# Patient Record
Sex: MALE | Race: BLACK OR AFRICAN AMERICAN | NOT HISPANIC OR LATINO | Employment: OTHER | URBAN - METROPOLITAN AREA
[De-identification: names, ages, dates, MRNs, and addresses within clinical notes are randomized per-mention and may not be internally consistent; named-entity substitution may affect disease eponyms.]

---

## 2017-04-05 ENCOUNTER — DEVICE CHECK (OUTPATIENT)
Dept: CARDIOLOGY | Facility: MEDICAL CENTER | Age: 82
End: 2017-04-05

## 2017-04-29 ENCOUNTER — OFFICE VISIT (OUTPATIENT)
Dept: URGENT CARE | Facility: PHYSICIAN GROUP | Age: 82
End: 2017-04-29
Payer: MEDICARE

## 2017-04-29 VITALS
OXYGEN SATURATION: 95 % | BODY MASS INDEX: 21.82 KG/M2 | HEIGHT: 68 IN | DIASTOLIC BLOOD PRESSURE: 70 MMHG | HEART RATE: 73 BPM | RESPIRATION RATE: 12 BRPM | TEMPERATURE: 98.2 F | SYSTOLIC BLOOD PRESSURE: 120 MMHG | WEIGHT: 144 LBS

## 2017-04-29 DIAGNOSIS — J40 BRONCHITIS: ICD-10-CM

## 2017-04-29 PROCEDURE — 3288F FALL RISK ASSESSMENT DOCD: CPT | Performed by: PHYSICIAN ASSISTANT

## 2017-04-29 PROCEDURE — 0518F FALL PLAN OF CARE DOCD: CPT | Mod: 8P | Performed by: PHYSICIAN ASSISTANT

## 2017-04-29 PROCEDURE — 1036F TOBACCO NON-USER: CPT | Performed by: PHYSICIAN ASSISTANT

## 2017-04-29 PROCEDURE — G8432 DEP SCR NOT DOC, RNG: HCPCS | Performed by: PHYSICIAN ASSISTANT

## 2017-04-29 PROCEDURE — 4040F PNEUMOC VAC/ADMIN/RCVD: CPT | Mod: 8P | Performed by: PHYSICIAN ASSISTANT

## 2017-04-29 PROCEDURE — 99203 OFFICE O/P NEW LOW 30 MIN: CPT | Performed by: PHYSICIAN ASSISTANT

## 2017-04-29 PROCEDURE — G8420 CALC BMI NORM PARAMETERS: HCPCS | Performed by: PHYSICIAN ASSISTANT

## 2017-04-29 PROCEDURE — 1100F PTFALLS ASSESS-DOCD GE2>/YR: CPT | Performed by: PHYSICIAN ASSISTANT

## 2017-04-29 RX ORDER — CODEINE PHOSPHATE AND GUAIFENESIN 10; 100 MG/5ML; MG/5ML
5 SOLUTION ORAL NIGHTLY PRN
Qty: 120 ML | Refills: 0 | Status: SHIPPED | OUTPATIENT
Start: 2017-04-29 | End: 2017-06-13

## 2017-04-29 NOTE — PROGRESS NOTES
Chief Complaint   Patient presents with   • Cough     cough, chest congestion x1week       HISTORY OF PRESENT ILLNESS: Patient is a 89 y.o. male who presents today with a friend for evaluation of persistent cough. Patient states he's been coughing for the past 6-8 months. He reports white sputum production. He denies much coughing during the day but does have a fair amount at night, making it difficult for him to sleep. He denies fever, shortness of breath, chills, sweats, body aches, ear pain, nasal congestion, and sore throat. The symptoms are not getting worse. He saw primary care provider in [place] and was told that it seems to be some sort of allergy.    Patient Active Problem List    Diagnosis Date Noted   • Cardiac pacemaker in situ      Priority: High   • Bradycardia 09/23/2015     Priority: High   • Hyperlipidemia 10/22/2015     Priority: Medium   • Renovascular hypertension 09/23/2015     Priority: Medium   • Anemia 11/12/2015     Priority: Low   • Dizziness 05/27/2016   • BPH (benign prostatic hypertrophy) 05/27/2016       Allergies:Review of patient's allergies indicates no known allergies.    Current Outpatient Prescriptions Ordered in [facility]   Medication Sig Dispense Refill   • guaifenesin-codeine (ROBITUSSIN AC) Solution oral solution Take 5 mL by mouth at bedtime as needed. 120 mL 0   • meclizine (ANTIVERT) 25 MG Tab Take 1 Tab by mouth 3 times a day as needed for Dizziness or Vertigo. 90 Tab 3   • tamsulosin (FLOMAX) 0.4 MG capsule Take 1 Cap by mouth every day. At night. 90 Cap 3   • finasteride (PROSCAR) 5 MG Tab Take 5 mg by mouth every day.       No current [facility]-ordered facility-administered medications on file.       Past Medical History   Diagnosis Date   • Hyperlipidemia    • Arrhythmia      Bradycardia    • Renovascular hypertension    • Sinus bradycardia September 2015     Status post PPM implantation.   • BPH (benign prostatic hypertrophy)    • Dizziness        Social History   Substance  "Use Topics   • Smoking status: Former Smoker -- 0.25 packs/day for 10 years     Types: Cigarettes     Quit date: 1957   • Smokeless tobacco: Never Used   • Alcohol Use: No       Family Status   Relation Status Death Age   • Mother     • Father       Family History   Problem Relation Age of Onset   • Heart Attack Neg Hx    • Heart Failure Neg Hx    • Heart Disease Mother    • Heart Disease Father        ROS:   Review of Systems   Constitutional: Negative for fever, chills, weight loss and malaise/fatigue.   HENT: Negative for ear pain, nosebleeds, congestion, sore throat and neck pain.    Eyes: Negative for blurred vision.   Respiratory: Negative for shortness of breath and wheezing.    Cardiovascular: Negative for chest pain, palpitations, orthopnea and leg swelling.   Gastrointestinal: Negative for heartburn, nausea, vomiting and abdominal pain.   Genitourinary: Negative for dysuria, urgency and frequency.       Exam:  Blood pressure 120/70, pulse 73, temperature 36.8 °C (98.2 °F), resp. rate 12, height 1.727 m (5' 8\"), weight 65.318 kg (144 lb), SpO2 95 %.  General: Thin appearing pleasant gentleman in no apparent distress.  HEENT: Conjunctiva clear, lids without ptosis, ears normal shape and contour, canals are clear bilaterally, tympanic membranes are benign, nasal mucosa benign, oropharynx is without erythema, edema or exudates.  Pulmonary: Clear to ausculation and percussion.  Normal effort. No rales, ronchi, or wheezing.   Cardiovascular: Regular rate and rhythm without murmur.   Neurologic: Grossly nonfocal.  Lymph: No cervical lymphadenopathy noted.  Skin: No obvious lesions.  Psych: Normal mood. Alert and oriented x3. Judgment and insight is normal.    No xray available on site    Assessment/Plan:  Discussed with patient that this could be due to allergies. Chest x-ray to rule out pneumonia. Use all medication as directed. Will contact patient with x-ray results.  1. Bronchitis  " guaifenesin-codeine (ROBITUSSIN AC) Solution oral solution    DX-CHEST-2 VIEWS

## 2017-04-29 NOTE — MR AVS SNAPSHOT
"        Ryan CORREA Felipe   2017 12:55 PM   Office Visit   MRN: [id number]    Department:  [facility]   Dept Phone:  [phone]    Description:  Male : 9/10/1921   Provider:  Radha Law PA-C           Reason for Visit     Cough cough, chest congestion x1week      Allergies as of 2017     No Known Allergies      You were diagnosed with     Bronchitis   [[id number]]         Vital Signs     Blood Pressure Pulse Temperature Respirations Height Weight    120/70 mmHg 73 36.8 °C (98.2 °F) 12 1.727 m (5' 8\") 65.318 kg (144 lb)    Body Mass Index Oxygen Saturation Smoking Status             21.90 kg/m2 95% Former Smoker         Basic Information     Date Of Birth Sex Race Ethnicity Preferred Language    9/10/1921 Male Black or  Non- English      Your appointments     May 24, 2017  8:20 AM   FOLLOW UP with Selena López M.D.   [facility]    [address] [place] [id number]   [phone]              Problem List              ICD-10-CM Priority Class Noted - Resolved    Bradycardia R00.1 High  2015 - Present    Renovascular hypertension I15.0 Medium  2015 - Present    Cardiac pacemaker in situ (Chronic) Z95.0 High  Unknown - Present    Hyperlipidemia E78.5 Medium  10/22/2015 - Present    Anemia D64.9 Low  2015 - Present    Dizziness R42   2016 - Present    BPH (benign prostatic hypertrophy) N40.0   2016 - Present      Health Maintenance        Date Due Completion Dates    IMM DTaP/Tdap/Td Vaccine (1 - Tdap) 9/10/1940 ---    IMM ZOSTER VACCINE 9/10/1981 ---    IMM PNEUMOCOCCAL 65+ (ADULT) LOW/MEDIUM RISK SERIES (1 of 2 - PCV13) 9/10/1986 ---            Current Immunizations     No immunizations on file.      Below and/or attached are the medications your provider expects you to take. Review all of your home medications and newly ordered medications with your provider and/or pharmacist. Follow " medication instructions as directed by your provider and/or pharmacist. Please keep your medication list with you and share with your provider. Update the information when medications are discontinued, doses are changed, or new medications (including over-the-counter products) are added; and carry medication information at all times in the event of emergency situations     Allergies:  No Known Allergies          Medications  Valid as of: April 29, 2017 -  1:31 PM    Generic Name Brand Name Tablet Size Instructions for use    Finasteride (Tab) PROSCAR 5 MG Take 5 mg by mouth every day.        Guaifenesin-Codeine (Solution) ROBITUSSIN -10 mg/5mL Take 5 mL by mouth at bedtime as needed.        Meclizine HCl (Tab) ANTIVERT 25 MG Take 1 Tab by mouth 3 times a day as needed for Dizziness or Vertigo.        Tamsulosin HCl (Cap) FLOMAX 0.4 MG Take 1 Cap by mouth every day. At night.        .                 Medicines prescribed today were sent to:     [facility] [address] [facility] [place] [address] [facility]     [address] [facility] [address]    Phone: [phone] Fax: [phone]    Open 24 Hours?: No      Medication refill instructions:       If your prescription bottle indicates you have medication refills left, it is not necessary to call your provider’s office. Please contact your pharmacy and they will refill your medication.    If your prescription bottle indicates you do not have any refills left, you may request refills at any time through one of the following ways: The online [employer] system (except Urgent Care), by calling your provider’s office, or by asking your pharmacy to contact your provider’s office with a refill request. Medication refills are processed only during regular business hours and may not be available until the next business day. Your provider may request additional information or to have a follow-up visit with you prior to refilling your medication.   *Please  Note: Medication refills are assigned a new Rx number when refilled electronically. Your pharmacy may indicate that no refills were authorized even though a new prescription for the same medication is available at the pharmacy. Please request the medicine by name with the pharmacy before contacting your provider for a refill.        Your To Do List     Future Labs/Procedures Complete By Expires    DX-CHEST-2 VIEWS  As directed 4/29/2018         [employer] Access Code: Activation code not generated  Current [employer] Status: Active

## 2017-05-01 ENCOUNTER — TELEPHONE (OUTPATIENT)
Dept: URGENT CARE | Facility: PHYSICIAN GROUP | Age: 82
End: 2017-05-01

## 2017-05-01 DIAGNOSIS — J18.9 PNEUMONIA OF RIGHT LOWER LOBE DUE TO INFECTIOUS ORGANISM: ICD-10-CM

## 2017-05-01 RX ORDER — DOXYCYCLINE HYCLATE 100 MG
100 TABLET ORAL 2 TIMES DAILY
Qty: 20 TAB | Refills: 0 | Status: SHIPPED | OUTPATIENT
Start: 2017-05-01 | End: 2017-06-13

## 2017-05-01 NOTE — TELEPHONE ENCOUNTER
Possible developing right lower lobe pneumonia per chest x-ray from [place], [place]. Discussed results with Joanne Blankenship, friend of patient who accompanied him to the visit. Since the doxycycline to [facility]. Advised follow up for worsening or persistent symptoms.

## 2017-05-24 ENCOUNTER — OFFICE VISIT (OUTPATIENT)
Dept: CARDIOLOGY | Facility: CLINIC | Age: 82
End: 2017-05-24
Payer: MEDICARE

## 2017-05-24 VITALS
DIASTOLIC BLOOD PRESSURE: 70 MMHG | SYSTOLIC BLOOD PRESSURE: 110 MMHG | BODY MASS INDEX: 22.13 KG/M2 | WEIGHT: 146 LBS | HEART RATE: 72 BPM | HEIGHT: 68 IN | OXYGEN SATURATION: 92 %

## 2017-05-24 DIAGNOSIS — I15.0 RENOVASCULAR HYPERTENSION: ICD-10-CM

## 2017-05-24 DIAGNOSIS — R00.1 BRADYCARDIA: ICD-10-CM

## 2017-05-24 DIAGNOSIS — Z95.0 CARDIAC PACEMAKER IN SITU: Chronic | ICD-10-CM

## 2017-05-24 PROCEDURE — 99214 OFFICE O/P EST MOD 30 MIN: CPT | Performed by: INTERNAL MEDICINE

## 2017-05-24 PROCEDURE — G8420 CALC BMI NORM PARAMETERS: HCPCS | Performed by: INTERNAL MEDICINE

## 2017-05-24 PROCEDURE — 3288F FALL RISK ASSESSMENT DOCD: CPT | Performed by: INTERNAL MEDICINE

## 2017-05-24 PROCEDURE — 4040F PNEUMOC VAC/ADMIN/RCVD: CPT | Mod: 8P | Performed by: INTERNAL MEDICINE

## 2017-05-24 PROCEDURE — 0518F FALL PLAN OF CARE DOCD: CPT | Mod: 8P | Performed by: INTERNAL MEDICINE

## 2017-05-24 PROCEDURE — 1100F PTFALLS ASSESS-DOCD GE2>/YR: CPT | Performed by: INTERNAL MEDICINE

## 2017-05-24 PROCEDURE — 1036F TOBACCO NON-USER: CPT | Performed by: INTERNAL MEDICINE

## 2017-05-24 PROCEDURE — G8432 DEP SCR NOT DOC, RNG: HCPCS | Performed by: INTERNAL MEDICINE

## 2017-05-24 ASSESSMENT — ENCOUNTER SYMPTOMS
HEARTBURN: 0
NAUSEA: 0
HEADACHES: 0
WEIGHT LOSS: 0
DEPRESSION: 0
BRUISES/BLEEDS EASILY: 0
PALPITATIONS: 0
COUGH: 0
DIZZINESS: 0
SHORTNESS OF BREATH: 0
NERVOUS/ANXIOUS: 0
LOSS OF CONSCIOUSNESS: 0
EYES NEGATIVE: 1

## 2017-05-24 NOTE — PROGRESS NOTES
Subjective:   Ryan Wang is a 89 y.o. male who presents today in follow-up in regards to his pacemaker implanted for dizziness and bradycardia    He is in good spirits. He is now 89  Seeing him in [place], seen last month in [place] pr PM check and Sept for Me & PM check  Seen for pneumonia last month    Past Medical History   Diagnosis Date   • Hyperlipidemia    • Arrhythmia      Bradycardia    • Renovascular hypertension    • Sinus bradycardia September 2015     Status post PPM implantation.   • BPH (benign prostatic hypertrophy)    • Dizziness      Past Surgical History   Procedure Laterality Date   • Tonsillectomy     • Recovery  9/29/2015     Procedure: CATH LAB PM INSERT [facility].JU[facility] ASENCIO;  Surgeon: [facility];  Location: SURGERY PRE-POST PROC UNIT [facility];  Service:    • Pacemaker insertion  September 2015     [employer] DR #[id number] implanted by Dr. Asencio.     Family History   Problem Relation Age of Onset   • Heart Attack Neg Hx    • Heart Failure Neg Hx    • Heart Disease Mother    • Heart Disease Father      History   Smoking status   • Former Smoker -- 0.25 packs/day for 10 years   • Types: Cigarettes   • Quit date: 01/01/1957   Smokeless tobacco   • Never Used     No Known Allergies  Outpatient Encounter Prescriptions as of 5/24/2017   Medication Sig Dispense Refill   • finasteride (PROSCAR) 5 MG Tab Take 5 mg by mouth every day.     • tamsulosin (FLOMAX) 0.4 MG capsule Take 1 Cap by mouth every day. At night. 90 Cap 3   • doxycycline (VIBRAMYCIN) 100 MG Tab Take 1 Tab by mouth 2 times a day. 20 Tab 0   • guaifenesin-codeine (ROBITUSSIN AC) Solution oral solution Take 5 mL by mouth at bedtime as needed. 120 mL 0   • meclizine (ANTIVERT) 25 MG Tab Take 1 Tab by mouth 3 times a day as needed for Dizziness or Vertigo. 90 Tab 3     No facility-administered encounter medications on file as of 5/24/2017.     Review of Systems   Constitutional: Negative for weight loss and malaise/fatigue.   HENT:  "Negative for hearing loss.    Eyes: Negative.    Respiratory: Negative for cough and shortness of breath.    Cardiovascular: Negative for chest pain, palpitations and leg swelling.   Gastrointestinal: Negative for heartburn and nausea.   Neurological: Negative for dizziness, loss of consciousness and headaches.   Endo/Heme/Allergies: Does not bruise/bleed easily.   Psychiatric/Behavioral: Negative for depression. The patient is not nervous/anxious.    All other systems reviewed and are negative.       Objective:   /70 mmHg  Pulse 72  Ht 1.727 m (5' 7.99\")  Wt 66.225 kg (146 lb)  BMI 22.20 kg/m2  SpO2 92%    Physical Exam   Constitutional: He is oriented to person, place, and time. He appears well-developed. No distress.   Healthy slender  gentleman, much younger than stated age, very well kempt   HENT:   Mouth/Throat: Mucous membranes are normal.   Eyes: Conjunctivae and EOM are normal.   Neck: No JVD present. No thyroid mass present.   Cardiovascular: Normal rate, regular rhythm and intact distal pulses.    No murmur heard.  PM in left chest   Pulmonary/Chest: Effort normal and breath sounds normal.   Musculoskeletal: Normal range of motion. He exhibits no edema.   Neurological: He is alert and oriented to person, place, and time. He has normal strength. He displays no tremor.   Skin: Skin is warm and dry. He is not diaphoretic.   Psychiatric: He has a normal mood and affect. His behavior is normal.   Vitals reviewed.      Assessment:     1. Bradycardia     2. Renovascular hypertension     3. Cardiac pacemaker in situ         Medical Decision Making:  Today's Assessment / Status / Plan:     I gave him congratulations. We talked about health and is longevity in his life in [place]. We talked about end-of-life care. He certainly does not want life-sustaining measures that, he feels very thankful for his health    PM/AF  No more AF, device functioning normally no changes made.  We " reviewed his monitor nora.   I certainly am not interested at this point in starting him on anticoagulation. His echo 2015 was normal    PNA, normal exam, records reviewed   RTC 6 months pacemaker check ONLY - Ill see him in 1y in [place]

## 2017-05-24 NOTE — Clinical Note
[facility] [address]  Phone: [phone]  Fax: [phone]              Ryan Wang  9/10/1921    Encounter Date: 5/24/2017    Selena López M.D.          PROGRESS NOTE:  Subjective:   Ryan Wang is a 89 y.o. male who presents today in follow-up in regards to his pacemaker implanted for dizziness and bradycardia    He is in good spirits. He is now 89  Seeing him in [place], seen last month in [facility] PM check and Sept for Me & PM check  Seen for pneumonia last month    Past Medical History   Diagnosis Date   • Hyperlipidemia    • Arrhythmia      Bradycardia    • Renovascular hypertension    • Sinus bradycardia September 2015     Status post PPM implantation.   • BPH (benign prostatic hypertrophy)    • Dizziness      Past Surgical History   Procedure Laterality Date   • Tonsillectomy     • Recovery  9/29/2015     Procedure: CATH LAB PM INSERT [facility] ASENC[facility];  Surgeon: Recovery[facility] Surgery;  [place] [facility];  Service:    • Pacemaker insertion  September 2015     [employer] [facility] #[id number] implanted by Dr. Asencio.     Family History   Problem Relation Age of Onset   • Heart Attack Neg Hx    • Heart Failure Neg Hx    • Heart Disease Mother    • Heart Disease Father      History   Smoking status   • Former Smoker -- 0.25 packs/day for 10 years   • Types: Cigarettes   • Quit date: 01/01/1957   Smokeless tobacco   • Never Used     No Known Allergies  Outpatient Encounter Prescriptions as of 5/24/2017   Medication Sig Dispense Refill   • finasteride (PROSCAR) 5 MG Tab Take 5 mg by mouth every day.     • tamsulosin (FLOMAX) 0.4 MG capsule Take 1 Cap by mouth every day. At night. 90 Cap 3   • doxycycline (VIBRAMYCIN) 100 MG Tab Take 1 Tab by mouth 2 times a day. 20 Tab 0   • guaifenesin-codeine (ROBITUSSIN AC) Solution oral solution Take 5 mL by mouth at bedtime as needed. 120 mL 0   • meclizine  "(ANTIVERT) 25 MG Tab Take 1 Tab by mouth 3 times a day as needed for Dizziness or Vertigo. 90 Tab 3     No facility-administered encounter medications on file as of 5/24/2017.     Review of Systems   Constitutional: Negative for weight loss and malaise/fatigue.   HENT: Negative for hearing loss.    Eyes: Negative.    Respiratory: Negative for cough and shortness of breath.    Cardiovascular: Negative for chest pain, palpitations and leg swelling.   Gastrointestinal: Negative for heartburn and nausea.   Neurological: Negative for dizziness, loss of consciousness and headaches.   Endo/Heme/Allergies: Does not bruise/bleed easily.   Psychiatric/Behavioral: Negative for depression. The patient is not nervous/anxious.    All other systems reviewed and are negative.       Objective:   /70 mmHg  Pulse 72  Ht 1.727 m (5' 7.99\")  Wt 66.225 kg (146 lb)  BMI 22.20 kg/m2  SpO2 92%    Physical Exam   Constitutional: He is oriented to person, place, and time. He appears well-developed. No distress.   Healthy slender  gentleman, much younger than stated age, very well kempt   HENT:   Mouth/Throat: Mucous membranes are normal.   Eyes: Conjunctivae and EOM are normal.   Neck: No JVD present. No thyroid mass present.   Cardiovascular: Normal rate, regular rhythm and intact distal pulses.    No murmur heard.  PM in left chest   Pulmonary/Chest: Effort normal and breath sounds normal.   Musculoskeletal: Normal range of motion. He exhibits no edema.   Neurological: He is alert and oriented to person, place, and time. He has normal strength. He displays no tremor.   Skin: Skin is warm and dry. He is not diaphoretic.   Psychiatric: He has a normal mood and affect. His behavior is normal.   Vitals reviewed.      Assessment:     1. Bradycardia     2. Renovascular hypertension     3. Cardiac pacemaker in situ         Medical Decision Making:  Today's Assessment / Status / Plan:     I gave him congratulations. We " talked about health and is longevity in his life in [place]. We talked about end-of-life care. He certainly does not want life-sustaining measures that, he feels very thankful for his health    PM/AF  No more AF, device functioning normally no changes made.  We reviewed his monitor tracinng.   I certainly am not interested at this point in starting him on anticoagulation. His echo 2015 was normal    PNA, normal exam, records reviewed   RTC 6 months pacemaker check ONLY - Ill see him in 1y in [place]          Vincent Hart M.D.  [address] [place] [id number]  VIA Facsimile: [phone]

## 2017-06-13 ENCOUNTER — OFFICE VISIT (OUTPATIENT)
Dept: URGENT CARE | Facility: PHYSICIAN GROUP | Age: 82
End: 2017-06-13
Payer: MEDICARE

## 2017-06-13 VITALS
RESPIRATION RATE: 12 BRPM | SYSTOLIC BLOOD PRESSURE: 102 MMHG | WEIGHT: 145 LBS | TEMPERATURE: 98.1 F | DIASTOLIC BLOOD PRESSURE: 60 MMHG | HEART RATE: 87 BPM | BODY MASS INDEX: 21.98 KG/M2 | OXYGEN SATURATION: 94 % | HEIGHT: 68 IN

## 2017-06-13 DIAGNOSIS — J44.9 CHRONIC OBSTRUCTIVE PULMONARY DISEASE, UNSPECIFIED COPD TYPE (HCC): Primary | ICD-10-CM

## 2017-06-13 DIAGNOSIS — R06.2 WHEEZE: ICD-10-CM

## 2017-06-13 PROCEDURE — 99214 OFFICE O/P EST MOD 30 MIN: CPT | Performed by: PHYSICIAN ASSISTANT

## 2017-06-13 RX ORDER — ALBUTEROL SULFATE 2.5 MG/3ML
2.5 SOLUTION RESPIRATORY (INHALATION) ONCE
Status: COMPLETED | OUTPATIENT
Start: 2017-06-13 | End: 2017-06-13

## 2017-06-13 RX ORDER — TIOTROPIUM BROMIDE 18 UG/1
18 CAPSULE ORAL; RESPIRATORY (INHALATION) DAILY
Qty: 30 CAP | Refills: 0 | Status: SHIPPED | OUTPATIENT
Start: 2017-06-13 | End: 2017-06-13

## 2017-06-13 RX ORDER — ALBUTEROL SULFATE 90 UG/1
2 AEROSOL, METERED RESPIRATORY (INHALATION) 2 TIMES DAILY PRN
Qty: 1 INHALER | Refills: 0 | Status: SHIPPED | OUTPATIENT
Start: 2017-06-13 | End: 2018-06-27

## 2017-06-13 RX ADMIN — ALBUTEROL SULFATE 2.5 MG: 2.5 SOLUTION RESPIRATORY (INHALATION) at 12:00

## 2017-06-13 NOTE — PROGRESS NOTES
Chief Complaint   Patient presents with   • Cough     cough, chest congestion x2 months        HISTORY OF PRESENT ILLNESS: Patient is a 89 y.o. male who presents today because he reports a history of coughing for the last 9 months. He is not a smoker, however, he did in his early 20s, but this gentleman is a very pleasant 89-year-old male. He reports white to cloudy phlegm production most evenings, and worsening cough in the evenings. Denies any fevers, chills, nausea, vomiting or diarrhea. He was seen 2 months ago, had a questionable right lower lobe infiltrate, he was put on doxycycline. The medication did not change his symptoms, he continues to have the same cough.    Patient Active Problem List    Diagnosis Date Noted   • Cardiac pacemaker in situ      Priority: High   • Bradycardia 09/23/2015     Priority: High   • Hyperlipidemia 10/22/2015     Priority: Medium   • Renovascular hypertension 09/23/2015     Priority: Medium   • Anemia 11/12/2015     Priority: Low   • Dizziness 05/27/2016   • BPH (benign prostatic hypertrophy) 05/27/2016       Allergies:Review of patient's allergies indicates no known allergies.    Current Outpatient Prescriptions Ordered in [facility]   Medication Sig Dispense Refill   • albuterol 108 (90 BASE) MCG/ACT Aero Soln inhalation aerosol Inhale 2 Puffs by mouth 2 times a day as needed. 1 Inhaler 0   • finasteride (PROSCAR) 5 MG Tab Take 5 mg by mouth every day.     • tamsulosin (FLOMAX) 0.4 MG capsule Take 1 Cap by mouth every day. At night. 90 Cap 3   • meclizine (ANTIVERT) 25 MG Tab Take 1 Tab by mouth 3 times a day as needed for Dizziness or Vertigo. 90 Tab 3     No current Epic-ordered facility-administered medications on file.       Past Medical History   Diagnosis Date   • Hyperlipidemia    • Arrhythmia      Bradycardia    • Renovascular hypertension    • Sinus bradycardia September 2015     Status post PPM implantation.   • BPH (benign prostatic hypertrophy)    • Dizziness   "      Social History   Substance Use Topics   • Smoking status: Former Smoker -- 0.25 packs/day for 10 years     Types: Cigarettes     Quit date: 1957   • Smokeless tobacco: Never Used   • Alcohol Use: No       Family Status   Relation Status Death Age   • Mother     • Father       Family History   Problem Relation Age of Onset   • Heart Attack Neg Hx    • Heart Failure Neg Hx    • Heart Disease Mother    • Heart Disease Father        ROS:  Review of Systems   Constitutional: Negative for fever, chills, weight loss and malaise/fatigue.   HENT: Negative for ear pain, nosebleeds, congestion, sore throat and neck pain.    Eyes: Negative for blurred vision.   Respiratory: Positive for cough, sputum production, occasional shortness of breath and has noticed wheezing.    Cardiovascular: Negative for chest pain, palpitations, orthopnea and leg swelling.   Gastrointestinal: Negative for heartburn, nausea, vomiting and abdominal pain.   Genitourinary: Negative for dysuria, urgency and frequency.     Exam:  Blood pressure 102/60, pulse 87, temperature 36.7 °C (98.1 °F), resp. rate 12, height 1.727 m (5' 8\"), weight 65.772 kg (145 lb), SpO2 94 %.  General:  Well nourished, well developed male in NAD  Head:Normocephalic, atraumatic  Eyes: PERRLA, EOM within normal limits, no conjunctival injection, no scleral icterus, visual fields and acuity grossly intact.  Ears: Normal shape and symmetry, no tenderness, no discharge. External canals are without any significant edema or erythema. Tympanic membranes are without any inflammation, no effusion. Gross auditory acuity is intact  Nose: Symmetrical without tenderness, no discharge.  Mouth: reasonable hygiene, no erythema exudates or tonsillar enlargement.  Neck: no masses, range of motion within normal limits, no tracheal deviation. No obvious thyroid enlargement.  Pulmonary: chest is symmetrical with respiration, few wheezes bilaterally. Some rhonchi " bilaterally, clearing with cough. After nebulization of albuterol the office, the patient stated significant subjective improvement. Bilaterally his lung sounds are clear, no wheezes, rales or rhonchi  Cardiovascular: regular rate and rhythm without murmurs, rubs, or gallops.  Extremities: no clubbing, cyanosis, or edema.    Please note that this dictation was created using voice recognition software. I have made every reasonable attempt to correct obvious errors, but I expect that there are errors of grammar and possibly content that I did not discover before finalizing the note.    Assessment/Plan:  1. Chronic obstructive pulmonary disease, unspecified COPD type ([facility])  albuterol 108 (90 BASE) MCG/ACT Aero Soln inhalation aerosol    DISCONTINUED: tiotropium (SPIRIVA HANDIHALER) 18 MCG Cap   2. Wheeze  albuterol (PROVENTIL) 2.5mg/3ml nebulizer solution 2.5 mg    albuterol 108 (90 BASE) MCG/ACT Aero Soln inhalation aerosol    symptoms ongoing for approximately 9 months, not responsive to a course of doxycycline. Not likely bacterial infection based on symptomatology and history. COPD evident on x-ray    Followup with primary care in the next 7-10 days if not significantly improving, return to the urgent care or go to the emergency room sooner for any worsening of symptoms.

## 2017-06-13 NOTE — PROGRESS NOTES
Chief Complaint:        History of Present Illness:    This is a new problem.    Symptoms x days; has     CXR (5/1/17) showed COPD and questionable right lower lobe pneumonia.    Review of Systems:    Constitutional: Negative for fever, chills, and diaphoresis.   Eyes: Negative for change in vision, photophobia, pain, redness, and discharge.  ENT: Negative for ear pain, ear discharge, hearing loss, tinnitus, nasal congestion, nosebleeds, and sore throat.    Respiratory: See HPI.    Cardiovascular: Negative for chest pain, palpitations, orthopnea, claudication, leg swelling, and PND.   Gastrointestinal: Negative for abdominal pain, nausea, vomiting, diarrhea, constipation, blood in stool, and melena.   Genitourinary: No new symptoms.  Musculoskeletal: Negative for myalgias, joint pain, neck pain, and back pain.   Skin: Negative for rash and itching.   Neurological: Negative for dizziness, tingling, tremors, sensory change, speech change, focal weakness, seizures, loss of consciousness, and headaches.   Endo: Negative for polydipsia.   Heme: Does not bruise/bleed easily.   Psychiatric/Behavioral: Negative for depression, suicidal ideas, hallucinations, memory loss and substance abuse. The patient is not nervous/anxious and does not have insomnia.      Past Medical History:    Past Medical History   Diagnosis Date   • Hyperlipidemia    • Arrhythmia      Bradycardia    • Renovascular hypertension    • Sinus bradycardia September 2015     Status post PPM implantation.   • BPH (benign prostatic hypertrophy)    • Dizziness        Past Surgical History:    Past Surgical History   Procedure Laterality Date   • Tonsillectomy     • Recovery  9/29/2015     Procedure: CATH LAB PM INSERT ST[facility] ASENCIO;  Surgeon: Recoveryonly Surgery;  Location: SURGERY PRE-POST PROC UNIT [facility];  Service:    • Pacemaker insertion  September 2015     [employer] DR #[id number] implanted by Dr. Asencio.       Social History:    Social History      Social History   • Marital Status: Single     Spouse Name: N/A   • Number of Children: N/A   • Years of Education: N/A     Occupational History   • Not on file.     Social History Main Topics   • Smoking status: Former Smoker -- 0.25 packs/day for 10 years     Types: Cigarettes     Quit date: 01/01/1957   • Smokeless tobacco: Never Used   • Alcohol Use: No   • Drug Use: No   • Sexual Activity:     Partners: Female     Other Topics Concern   • Not on file     Social History Narrative       Family History:    Family History   Problem Relation Age of Onset   • Heart Attack Neg Hx    • Heart Failure Neg Hx    • Heart Disease Mother    • Heart Disease Father        Medications:    Current Outpatient Prescriptions on File Prior to Visit   Medication Sig Dispense Refill   • meclizine (ANTIVERT) 25 MG Tab Take 1 Tab by mouth 3 times a day as needed for Dizziness or Vertigo. 90 Tab 3   • finasteride (PROSCAR) 5 MG Tab Take 5 mg by mouth every day.     • tamsulosin (FLOMAX) 0.4 MG capsule Take 1 Cap by mouth every day. At night. 90 Cap 3     No current facility-administered medications on file prior to visit.       Allergies:    No Known Allergies      Vitals:        Physical Exam:    Constitutional: Vital signs reviewed. Appears well-developed and well-nourished. No acute distress.   Eyes: Sclera white, conjunctivae clear.   ENT: External ears normal. External auditory canals normal without discharge. TMs translucent and non-bulging. Hearing normal. Nasal mucosa pink. Lips/teeth are normal. Oral mucosa pink and moist. Posterior pharynx: WNL.  Neck: Neck supple.   Cardiovascular: Regular rate and rhythm. No murmur.  Pulmonary/Chest: Respirations non-labored. Clear to auscultation bilaterally.  Lymph: Cervical nodes without tenderness or enlargement.  Musculoskeletal: Normal gait. Normal range of motion. No muscular atrophy or weakness.  Neurological: Alert and oriented to person, place, and time. Muscle tone normal.  Coordination normal.   Skin: No rashes or lesions. Warm, dry, normal turgor.  Psychiatric: Normal mood and affect. Behavior is normal. Judgment and thought content normal.     Diagnostics:      Assessment / Plan:        Discussed with him DDX and management options.    Agreeable to medication prescribed.    Follow-up with PCP or urgent care if getting worse or not better with above.

## 2017-09-01 ENCOUNTER — PATIENT OUTREACH (OUTPATIENT)
Dept: HEALTH INFORMATION MANAGEMENT | Facility: OTHER | Age: 82
End: 2017-09-01

## 2017-09-01 NOTE — PROGRESS NOTES
Attempt #:1    WebIZ Checked & Epic Updated: yes  HealthConnect Verified: no  Verify PCP: yes    Communication Preference Obtained: yes     Review Care Team: yes    Annual Wellness Visit Scheduling  1. Scheduling Status:Not Scheduled. Patient states they see PCP regularly         Care Gap Scheduling (Attempt to Schedule EACH Overdue Care Gap!)- NON [facility] PCP      Health Maintenance Due   Topic Date Due   • IMM DTaP/Tdap/Td Vaccine (1 - Tdap) 09[birth year]10/1940   • IMM ZOSTER VACCINE  09/10/1981   • IMM PNEUMOCOCCAL 65+ (ADULT) LOW/MEDIUM RISK SERIES (1 of 2 - PCV13) 09/10/1986   • Annual Wellness Visit  05/28/2017   • IMM INFLUENZA (1) 09/01/2017         [employer] Activation: already active  [employer] Juanjose: no  Virtual Visits: no  Opt In to Text Messages: no

## 2017-09-29 ENCOUNTER — OFFICE VISIT (OUTPATIENT)
Dept: CARDIOLOGY | Facility: PHYSICIAN GROUP | Age: 82
End: 2017-09-29
Payer: MEDICARE

## 2017-09-29 VITALS — SYSTOLIC BLOOD PRESSURE: 118 MMHG | HEART RATE: 60 BPM | DIASTOLIC BLOOD PRESSURE: 70 MMHG

## 2017-09-29 DIAGNOSIS — Z95.0 CARDIAC PACEMAKER IN SITU: Chronic | ICD-10-CM

## 2017-09-29 DIAGNOSIS — R00.1 BRADYCARDIA: ICD-10-CM

## 2017-09-29 DIAGNOSIS — E78.01 FAMILIAL HYPERCHOLESTEROLEMIA: ICD-10-CM

## 2017-09-29 PROCEDURE — 99214 OFFICE O/P EST MOD 30 MIN: CPT | Performed by: INTERNAL MEDICINE

## 2017-09-29 ASSESSMENT — ENCOUNTER SYMPTOMS
NERVOUS/ANXIOUS: 0
DIZZINESS: 0
WEIGHT LOSS: 0
DEPRESSION: 0
COUGH: 0
MUSCULOSKELETAL NEGATIVE: 1
EYES NEGATIVE: 1
HEARTBURN: 0
HEADACHES: 0
PALPITATIONS: 0
BRUISES/BLEEDS EASILY: 0
NAUSEA: 0
SHORTNESS OF BREATH: 0
LOSS OF CONSCIOUSNESS: 0

## 2017-09-29 NOTE — LETTER
[facility] [address]  Phone: [phone]  Fax: [phone]              Ryan Wang  9/10/1921    Encounter Date: 9/29/2017    Selena López M.D.          PROGRESS NOTE:  Subjective:   Ryan Wang is a 89 y.o. male who presents today in follow-up in regards to his pacemaker implanted for dizziness and bradycardia    He is in good spirits. He is now 89  Seeing him here in [place]  No setbacks    Past Medical History:   Diagnosis Date   • Sinus bradycardia September 2015    Status post PPM implantation.   • Arrhythmia     Bradycardia    • BPH (benign prostatic hypertrophy)    • Dizziness    • Hyperlipidemia    • Renovascular hypertension      Past Surgical History:   Procedure Laterality Date   • RECOVERY  9/29/2015    Procedure: CATH LAB PM INSERT [facility];  Surgeon: [facility];  Location: SURGERY PRE-POST PROC UNIT [facility] [place] [facility];  Service:    • PACEMAKER INSERTION  September 2015    [employer] DR #[id number] implanted by Dr. Asencio.   • TONSILLECTOMY       Family History   Problem Relation Age of Onset   • Heart Disease Mother    • Heart Disease Father    • Heart Attack Neg Hx    • Heart Failure Neg Hx      History   Smoking Status   • Former Smoker   • Packs/day: 0.25   • Years: 10.00   • Types: Cigarettes   • Quit date: 1/1/1957   Smokeless Tobacco   • Never Used     No Known Allergies  Outpatient Encounter Prescriptions as of 9/29/2017   Medication Sig Dispense Refill   • albuterol 108 (90 BASE) MCG/ACT Aero Soln inhalation aerosol Inhale 2 Puffs by mouth 2 times a day as needed. 1 Inhaler 0   • meclizine (ANTIVERT) 25 MG Tab Take 1 Tab by mouth 3 times a day as needed for Dizziness or Vertigo. 90 Tab 3   • finasteride (PROSCAR) 5 MG Tab Take 5 mg by mouth every day.     • tamsulosin (FLOMAX) 0.4 MG capsule Take 1 Cap by mouth every day. At night. 90 Cap 3     No facility-administered encounter medications  on file as of 9/29/2017.      Review of Systems   Constitutional: Negative for malaise/fatigue and weight loss.   HENT: Negative for hearing loss.    Eyes: Negative.    Respiratory: Negative for cough and shortness of breath.    Cardiovascular: Negative for chest pain, palpitations and leg swelling.   Gastrointestinal: Negative for heartburn and nausea.   Musculoskeletal: Negative.    Neurological: Negative for dizziness, loss of consciousness and headaches.   Endo/Heme/Allergies: Does not bruise/bleed easily.   Psychiatric/Behavioral: Negative for depression. The patient is not nervous/anxious.    All other systems reviewed and are negative.       Objective:   /70   Pulse 60     Physical Exam   Constitutional: He is oriented to person, place, and time. He appears well-developed and well-nourished.   Healthy slender  gentleman, much younger than stated age, very well kempt   HENT:   Head: Normocephalic and atraumatic.   Mouth/Throat: Mucous membranes are normal.   Eyes: EOM are normal. No scleral icterus.   Neck: No JVD present. No thyroid mass and no thyromegaly present.   Cardiovascular: Normal rate, regular rhythm and intact distal pulses.    No murmur heard.  PM in left chest   Pulmonary/Chest: Effort normal and breath sounds normal. No respiratory distress.   Abdominal: Bowel sounds are normal. He exhibits no distension.   Musculoskeletal: Normal range of motion. He exhibits no edema or tenderness.   Neurological: He is alert and oriented to person, place, and time. He has normal strength. He displays no tremor. He exhibits normal muscle tone. Coordination normal.   Skin: Skin is warm and dry.   Psychiatric: He has a normal mood and affect. His behavior is normal.   Vitals reviewed.      Assessment:     1. Bradycardia     2. Cardiac pacemaker in situ     3. Familial hypercholesterolemia         Medical Decision Making:  Today's Assessment / Status / Plan:     End of life care,   I gave  him congratulations. We talked about health and is longevity in his life in [place]. We talked about end-of-life care as usual. He certainly does not want life-sustaining measures that, he feels very thankful for his health.  DNR.    PM/AF    No more AF, device functioning normally no changes made - last check reviewed.  Recheck as planned 11/30    I certainly am not interested at this point in starting him on anticoagulation. His echo 2015 was normal    RTC 6 months pacemaker check ONLY [place]  I will see him PRRAJI Hart M.D.  [address] [place] [id number]  VIA Facsimile: [phone]

## 2017-11-30 ENCOUNTER — NON-PROVIDER VISIT (OUTPATIENT)
Dept: CARDIOLOGY | Facility: PHYSICIAN GROUP | Age: 82
End: 2017-11-30
Payer: MEDICARE

## 2017-11-30 VITALS
SYSTOLIC BLOOD PRESSURE: 128 MMHG | DIASTOLIC BLOOD PRESSURE: 72 MMHG | BODY MASS INDEX: 21.52 KG/M2 | OXYGEN SATURATION: 97 % | WEIGHT: 142 LBS | HEART RATE: 120 BPM | HEIGHT: 68 IN

## 2017-11-30 DIAGNOSIS — R00.1 BRADYCARDIA: ICD-10-CM

## 2017-11-30 DIAGNOSIS — Z95.0 CARDIAC PACEMAKER IN SITU: Chronic | ICD-10-CM

## 2017-11-30 PROCEDURE — 93288 INTERROG EVL PM/LDLS PM IP: CPT | Performed by: NURSE PRACTITIONER

## 2018-06-05 ENCOUNTER — TELEPHONE (OUTPATIENT)
Dept: CARDIOLOGY | Facility: MEDICAL CENTER | Age: 83
End: 2018-06-05

## 2018-06-05 NOTE — TELEPHONE ENCOUNTER
Joanne is his caregiver & has POA along w/ pt's daughter. She'll be out of town and can't come to appt. for PMC.  Elderly neighbor is bringing him. Wanted Gabby to know he's been hospitalized x 3 for pneumonia since April at [facility]. She is worried he'll forget to tell Gabby & also forget to tell her about recent LE edema. Also had a syncopal episode before second admission for pneumonia. I assured her Gabby would be informed. Told her to feel free to call back next week to check to see if any med changes or future appts. were made. To Gabby Raya    Will have M.A. obtain records from [facility]

## 2018-06-05 NOTE — TELEPHONE ENCOUNTER
----- Message from Laureen Starr sent at 6/5/2018  8:49 AM PDT -----  Regarding: caregiver wants to discuss 06/07 appt  AB/Keri      Patient's caregiver Joanne Blankenship wants to speak to you about his 06/07 appt. She wants to give an update of his condition, so Agbby is aware of it before the appt. She can be reached on her cell at [phone].

## 2018-06-07 ENCOUNTER — NON-PROVIDER VISIT (OUTPATIENT)
Dept: CARDIOLOGY | Facility: PHYSICIAN GROUP | Age: 83
End: 2018-06-07
Payer: MEDICARE

## 2018-06-07 VITALS
HEART RATE: 94 BPM | DIASTOLIC BLOOD PRESSURE: 70 MMHG | SYSTOLIC BLOOD PRESSURE: 120 MMHG | OXYGEN SATURATION: 92 % | HEIGHT: 68 IN | BODY MASS INDEX: 21.82 KG/M2 | WEIGHT: 144 LBS

## 2018-06-07 DIAGNOSIS — R00.1 BRADYCARDIA: ICD-10-CM

## 2018-06-07 DIAGNOSIS — R60.0 LOCALIZED EDEMA: ICD-10-CM

## 2018-06-07 DIAGNOSIS — Z95.0 CARDIAC PACEMAKER IN SITU: Chronic | ICD-10-CM

## 2018-06-07 PROCEDURE — 93288 INTERROG EVL PM/LDLS PM IP: CPT | Performed by: NURSE PRACTITIONER

## 2018-06-27 ENCOUNTER — OFFICE VISIT (OUTPATIENT)
Dept: CARDIOLOGY | Facility: PHYSICIAN GROUP | Age: 83
End: 2018-06-27
Payer: MEDICARE

## 2018-06-27 VITALS
BODY MASS INDEX: 20.76 KG/M2 | HEART RATE: 70 BPM | DIASTOLIC BLOOD PRESSURE: 60 MMHG | WEIGHT: 137 LBS | SYSTOLIC BLOOD PRESSURE: 130 MMHG | HEIGHT: 68 IN | OXYGEN SATURATION: 90 %

## 2018-06-27 DIAGNOSIS — R00.1 BRADYCARDIA: ICD-10-CM

## 2018-06-27 DIAGNOSIS — N40.0 BENIGN PROSTATIC HYPERPLASIA WITHOUT LOWER URINARY TRACT SYMPTOMS: ICD-10-CM

## 2018-06-27 DIAGNOSIS — Z95.0 CARDIAC PACEMAKER IN SITU: Chronic | ICD-10-CM

## 2018-06-27 DIAGNOSIS — R60.0 LOCALIZED EDEMA: ICD-10-CM

## 2018-06-27 PROCEDURE — 99214 OFFICE O/P EST MOD 30 MIN: CPT | Performed by: NURSE PRACTITIONER

## 2018-06-27 RX ORDER — FUROSEMIDE 20 MG/1
20 TABLET ORAL DAILY
Qty: 30 TAB | Refills: 1 | Status: SHIPPED | OUTPATIENT
Start: 2018-06-27 | End: 2019-01-10 | Stop reason: SDUPTHER

## 2018-06-27 RX ORDER — POTASSIUM CHLORIDE 750 MG/1
10 CAPSULE, EXTENDED RELEASE ORAL 2 TIMES DAILY
Qty: 30 CAP | Refills: 1 | Status: SHIPPED | OUTPATIENT
Start: 2018-06-27 | End: 2019-01-10 | Stop reason: SDUPTHER

## 2018-06-27 ASSESSMENT — ENCOUNTER SYMPTOMS
PALPITATIONS: 0
CHILLS: 0
DIZZINESS: 0
PND: 0
LOSS OF CONSCIOUSNESS: 0
ABDOMINAL PAIN: 0
COUGH: 0
MYALGIAS: 0
HEADACHES: 0
BRUISES/BLEEDS EASILY: 0
ORTHOPNEA: 0
MEMORY LOSS: 1
SHORTNESS OF BREATH: 0
FEVER: 0
NAUSEA: 0

## 2018-06-27 NOTE — PROGRESS NOTES
Chief Complaint   Patient presents with   • Follow-Up   • Edema       Subjective:   Ryan Wang is a 89 y.o. male who presents today for increasing LE edema.    Ryan is a 89 year old male with history of bradycardia with PM since 2015. I just saw him a couple of weeks ago. He also has mild hypertension and hyperlipidemia.    He is here today with his caregiver, who states he has had increasing LE edema in the last week. He does not elevate his legs when sitting. He does have some memory issues/beginning dementia, but otherwise, no other complaints: no chest pain, pressure or discomfort; no palpitations; no shortness of breath, no dizziness or syncope. BP has been stable.    Past Medical History:   Diagnosis Date   • Arrhythmia     Bradycardia    • BPH (benign prostatic hypertrophy)    • Dizziness    • Edema    • Hyperlipidemia    • Pneumonia 04/2018    Hospitalized at [facility].   • Renovascular hypertension    • Sinus bradycardia September 2015    Status post PPM implantation.     Past Surgical History:   Procedure Laterality Date   • RECOVERY  9/29/2015    Procedure: CATH LAB PM INSERT [facility];  Surgeon: Recoveryonly Surgery;  Location: SURGERY PRE-POST PROC [facility];  Service:    • PACEMAKER INSERTION  September 2015    [employer] [address] #[id number] implanted by Dr. Asencio.   • TONSILLECTOMY       Family History   Problem Relation Age of Onset   • Heart Disease Mother    • Heart Disease Father    • Heart Attack Neg Hx    • Heart Failure Neg Hx      Social History     Social History   • Marital status: Single     Spouse name: N/A   • Number of children: N/A   • Years of education: N/A     Occupational History   • Not on file.     Social History Main Topics   • Smoking status: Former Smoker     Packs/day: 0.25     Years: 10.00     Types: Cigarettes     Quit date: 1/1/1957   • Smokeless tobacco: Never Used   • Alcohol use No   • Drug use: No   • Sexual activity: Not Currently     Partners:  "Female     Other Topics Concern   • Not on file     Social History Narrative   • No narrative on file     No Known Allergies  Outpatient Encounter Prescriptions as of 6/27/2018   Medication Sig Dispense Refill   • Docusate Sodium (COLACE PO) Take  by mouth.     • furosemide (LASIX) 20 MG Tab Take 1 Tab by mouth every day. 30 Tab 1   • potassium chloride (MICRO-K) 10 MEQ capsule Take 1 Cap by mouth 2 times a day. 30 Cap 1   • finasteride (PROSCAR) 5 MG Tab Take 5 mg by mouth every day.     • tamsulosin (FLOMAX) 0.4 MG capsule Take 1 Cap by mouth every day. At night. 90 Cap 3   • [DISCONTINUED] albuterol 108 (90 BASE) MCG/ACT Aero Soln inhalation aerosol Inhale 2 Puffs by mouth 2 times a day as needed. 1 Inhaler 0   • [DISCONTINUED] meclizine (ANTIVERT) 25 MG Tab Take 1 Tab by mouth 3 times a day as needed for Dizziness or Vertigo. 90 Tab 3     No facility-administered encounter medications on file as of 6/27/2018.      Review of Systems   Constitutional: Negative for chills and fever.   Respiratory: Negative for cough and shortness of breath.    Cardiovascular: Positive for leg swelling. Negative for chest pain, palpitations, orthopnea and PND.   Gastrointestinal: Negative for abdominal pain and nausea.   Musculoskeletal: Negative for myalgias.   Neurological: Negative for dizziness, loss of consciousness and headaches.   Endo/Heme/Allergies: Does not bruise/bleed easily.   Psychiatric/Behavioral: Positive for memory loss.        Objective:   /60   Pulse 70   Ht 1.727 m (5' 8\")   Wt 62.1 kg (137 lb)   SpO2 90%   BMI 20.83 kg/m²     Physical Exam   Constitutional: He is oriented to person, place, and time. He appears well-developed and well-nourished.   Tall, thin (BMI 20.83)   HENT:   Head: Normocephalic.   Eyes: EOM are normal.   Neck: Normal range of motion. Neck supple. No JVD present.   Cardiovascular: Normal rate, regular rhythm and normal heart sounds.    Pulmonary/Chest: Effort normal and breath " sounds normal. No respiratory distress. He has no wheezes. He has no rales.   PM in left chest wall.   Abdominal: Soft. Bowel sounds are normal. He exhibits no distension. There is no tenderness.   Musculoskeletal: Normal range of motion. He exhibits edema.   1-2+ edema in his feet, to the ankles bilaterally.   Neurological: He is alert and oriented to person, place, and time.   Skin: Skin is warm and dry. No rash noted.   Psychiatric: He has a normal mood and affect.       Assessment:     1. Localized edema  furosemide (LASIX) 20 MG Tab    potassium chloride (MICRO-K) 10 MEQ capsule    BASIC METABOLIC PANEL   2. Cardiac pacemaker in situ     3. Bradycardia     4. Benign prostatic hyperplasia without lower urinary tract symptoms         Medical Decision Making:  Today's Assessment / Status / Plan:     1. Edema, to add Lasix 20mg and KCl 10mEq once daily x 3 days, and then PRN. BMP in 1-2 weeks. FU with me in 4 weeks in [place]. Elevated legs when sitting, avoid salty foods.    2. Bradycardia with PPM, which was working normally two weeks ago.    3. BPH, treated, stable.    Plan as above. FU with me in 4 weeks in [place], labs before.    Collaborating MD: NAKIA López

## 2018-06-27 NOTE — LETTER
[facility]   [address] [place] [address] [place] [address]  Phone: [phone]  Fax: [phone]              Ryan Wang  9/10/1921    Encounter Date: 6/27/2018    YAN Sierra          PROGRESS NOTE:  Chief Complaint   Patient presents with   • Follow-Up   • Edema       Subjective:   Ryan Wang is a 89 y.o. male who presents today for increasing LE edema.    Ryan is a 89 year old male with history of bradycardia with PM since 2015. I just saw him a couple of weeks ago. He also has mild hypertension and hyperlipidemia.    He is here today with his caregiver, who states he has had increasing LE edema in the last week. He does not elevate his legs when sitting. He does have some memory issues/beginning dementia, but otherwise, no other complaints: no chest pain, pressure or discomfort; no palpitations; no shortness of breath, no dizziness or syncope. BP has been stable.    Past Medical History:   Diagnosis Date   • Arrhythmia     Bradycardia    • BPH (benign prostatic hypertrophy)    • Dizziness    • Edema    • Hyperlipidemia    • Pneumonia 04/2018    Hospitalized at [facility].   • Renovascular hypertension    • Sinus bradycardia September 2015    Status post PPM implantation.     Past Surgical History:   Procedure Laterality Date   • RECOVERY  9/29/2015    Procedure: CATH LAB PM INSERT [facility];  Surgeon: Recoveryonly Surgery;  Location: SURGERY PRE-POST PROC [place] [place];  Service:    • PACEMAKER INSERTION  September 2015    [employer] [facility] #[id number] implanted by Dr. Asencio.   • TONSILLECTOMY       Family History   Problem Relation Age of Onset   • Heart Disease Mother    • Heart Disease Father    • Heart Attack Neg Hx    • Heart Failure Neg Hx      Social History     Social History   • Marital status: Single     Spouse name: N/A   • Number of children: N/A   • Years of education: N/A     Occupational History   • Not on file.      "    Social History Main Topics   • Smoking status: Former Smoker     Packs/day: 0.25     Years: 10.00     Types: Cigarettes     Quit date: 1/1/1957   • Smokeless tobacco: Never Used   • Alcohol use No   • Drug use: No   • Sexual activity: Not Currently     Partners: Female     Other Topics Concern   • Not on file     Social History Narrative   • No narrative on file     No Known Allergies  Outpatient Encounter Prescriptions as of 6/27/2018   Medication Sig Dispense Refill   • Docusate Sodium (COLACE PO) Take  by mouth.     • furosemide (LASIX) 20 MG Tab Take 1 Tab by mouth every day. 30 Tab 1   • potassium chloride (MICRO-K) 10 MEQ capsule Take 1 Cap by mouth 2 times a day. 30 Cap 1   • finasteride (PROSCAR) 5 MG Tab Take 5 mg by mouth every day.     • tamsulosin (FLOMAX) 0.4 MG capsule Take 1 Cap by mouth every day. At night. 90 Cap 3   • [DISCONTINUED] albuterol 108 (90 BASE) MCG/ACT Aero Soln inhalation aerosol Inhale 2 Puffs by mouth 2 times a day as needed. 1 Inhaler 0   • [DISCONTINUED] meclizine (ANTIVERT) 25 MG Tab Take 1 Tab by mouth 3 times a day as needed for Dizziness or Vertigo. 90 Tab 3     No facility-administered encounter medications on file as of 6/27/2018.      Review of Systems   Constitutional: Negative for chills and fever.   Respiratory: Negative for cough and shortness of breath.    Cardiovascular: Positive for leg swelling. Negative for chest pain, palpitations, orthopnea and PND.   Gastrointestinal: Negative for abdominal pain and nausea.   Musculoskeletal: Negative for myalgias.   Neurological: Negative for dizziness, loss of consciousness and headaches.   Endo/Heme/Allergies: Does not bruise/bleed easily.   Psychiatric/Behavioral: Positive for memory loss.        Objective:   /60   Pulse 70   Ht 1.727 m (5' 8\")   Wt 62.1 kg (137 lb)   SpO2 90%   BMI 20.83 kg/m²      Physical Exam   Constitutional: He is oriented to person, place, and time. He appears well-developed and " well-nourished.   Tall, thin (BMI 20.83)   HENT:   Head: Normocephalic.   Eyes: EOM are normal.   Neck: Normal range of motion. Neck supple. No JVD present.   Cardiovascular: Normal rate, regular rhythm and normal heart sounds.    Pulmonary/Chest: Effort normal and breath sounds normal. No respiratory distress. He has no wheezes. He has no rales.   PM in left chest wall.   Abdominal: Soft. Bowel sounds are normal. He exhibits no distension. There is no tenderness.   Musculoskeletal: Normal range of motion. He exhibits edema.   1-2+ edema in his feet, to the ankles bilaterally.   Neurological: He is alert and oriented to person, place, and time.   Skin: Skin is warm and dry. No rash noted.   Psychiatric: He has a normal mood and affect.       Assessment:     1. Localized edema  furosemide (LASIX) 20 MG Tab    potassium chloride (MICRO-K) 10 MEQ capsule    BASIC METABOLIC PANEL   2. Cardiac pacemaker in situ     3. Bradycardia     4. Benign prostatic hyperplasia without lower urinary tract symptoms         Medical Decision Making:  Today's Assessment / Status / Plan:     1. Edema, to add Lasix 20mg and KCl 10mEq once daily x 3 days, and then PRN. BMP in 1-2 weeks. FU with me in 4 weeks in [place]. Elevated legs when sitting, avoid salty foods.    2. Bradycardia with PPM, which was working normally two weeks ago.    3. BPH, treated, stable.    Plan as above. FU with me in 4 weeks in [place], labs before.    Collaborating MD: NAKIA López      No Recipients

## 2018-07-25 ENCOUNTER — OFFICE VISIT (OUTPATIENT)
Dept: CARDIOLOGY | Facility: PHYSICIAN GROUP | Age: 83
End: 2018-07-25
Payer: MEDICARE

## 2018-07-25 VITALS
OXYGEN SATURATION: 90 % | HEIGHT: 68 IN | SYSTOLIC BLOOD PRESSURE: 120 MMHG | WEIGHT: 136 LBS | DIASTOLIC BLOOD PRESSURE: 70 MMHG | BODY MASS INDEX: 20.61 KG/M2 | HEART RATE: 70 BPM

## 2018-07-25 DIAGNOSIS — R00.1 BRADYCARDIA: ICD-10-CM

## 2018-07-25 DIAGNOSIS — Z95.0 CARDIAC PACEMAKER IN SITU: Chronic | ICD-10-CM

## 2018-07-25 DIAGNOSIS — F03.90 DEMENTIA WITHOUT BEHAVIORAL DISTURBANCE, UNSPECIFIED DEMENTIA TYPE: ICD-10-CM

## 2018-07-25 DIAGNOSIS — R60.0 LOCALIZED EDEMA: ICD-10-CM

## 2018-07-25 PROCEDURE — 99214 OFFICE O/P EST MOD 30 MIN: CPT | Performed by: NURSE PRACTITIONER

## 2018-07-25 ASSESSMENT — ENCOUNTER SYMPTOMS
CHILLS: 0
HEADACHES: 0
ORTHOPNEA: 0
SHORTNESS OF BREATH: 0
ABDOMINAL PAIN: 0
DIZZINESS: 0
COUGH: 0
MYALGIAS: 0
NAUSEA: 0
PND: 0
LOSS OF CONSCIOUSNESS: 0
PALPITATIONS: 0
MEMORY LOSS: 1
FEVER: 0
BRUISES/BLEEDS EASILY: 0

## 2018-07-25 NOTE — LETTER
[facility]   [address] [place] [address]  Phone: [phone]  Fax: [phone]              Ryan Wang  9/10/1921    Encounter Date: 7/25/2018    YAN Sierra          PROGRESS NOTE:  Chief Complaint   Patient presents with   • Follow-Up   • Edema   • Evaluation Of Medication Change       Subjective:   Ryan Wang is a 89 y.o. male who presents today for four week follow-up of edema.    Ryan is a 89 year old male with history of bradycardia with PM since 2015, mild hypertension and hyperlipidemia, normally followed by Dr. NOMAN López.    At last follow-up, he presented with his caregiver, as he was having increasing LE edema. I added some low dose Lasix/KCl for several days, and then PRN.    He is here today for follow-up. Edema is improved, but not completely gone.  Generally, he is doing well.  His caregiver does state that his memory appears to be getting worse, and was wondering if they can have this evaluated. She would also like to try OTC Prevagen. Otherwise, no other complaints: no chest pain, pressure or discomfort; no palpitations; no shortness of breath, no dizziness or syncope. BP has been stable. He is quite sedentary.    Past Medical History:   Diagnosis Date   • Arrhythmia     Bradycardia    • BPH (benign prostatic hypertrophy)    • Dizziness    • Edema    • Hyperlipidemia    • Pneumonia 04/2018    Hospitalized at [facility].   • Renovascular hypertension    • Sinus bradycardia September 2015    Status post PPM implantation.     Past Surgical History:   Procedure Laterality Date   • RECOVERY  9/29/2015    Procedure: CATH LAB PM INSERT [facility];  Surgeon: Waylon Surgery;  [place]: SURG[place] PRE-[place] PROC [place] [facility] [place];  Service:    • PACEMAKER INSERTION  September 2015    [employer]  #[id number] implanted by Dr. Asencio.   • TONSILLECTOMY       Family History   Problem Relation Age of Onset   •  "Heart Disease Mother    • Heart Disease Father    • Heart Attack Neg Hx    • Heart Failure Neg Hx      Social History     Social History   • Marital status: Single     Spouse name: N/A   • Number of children: N/A   • Years of education: N/A     Occupational History   • Not on file.     Social History Main Topics   • Smoking status: Former Smoker     Packs/day: 0.25     Years: 10.00     Types: Cigarettes     Quit date: 1/1/1957   • Smokeless tobacco: Never Used   • Alcohol use No   • Drug use: No   • Sexual activity: Not Currently     Partners: Female     Other Topics Concern   • Not on file     Social History Narrative   • No narrative on file     No Known Allergies  Outpatient Encounter Prescriptions as of 7/25/2018   Medication Sig Dispense Refill   • Docusate Sodium (COLACE PO) Take  by mouth.     • finasteride (PROSCAR) 5 MG Tab Take 5 mg by mouth every day.     • tamsulosin (FLOMAX) 0.4 MG capsule Take 1 Cap by mouth every day. At night. 90 Cap 3   • furosemide (LASIX) 20 MG Tab Take 1 Tab by mouth every day. 30 Tab 1   • potassium chloride (MICRO-K) 10 MEQ capsule Take 1 Cap by mouth 2 times a day. 30 Cap 1     No facility-administered encounter medications on file as of 7/25/2018.      Review of Systems   Constitutional: Negative for chills and fever.   Respiratory: Negative for cough and shortness of breath.    Cardiovascular: Positive for leg swelling. Negative for chest pain, palpitations, orthopnea and PND.        Improved on Lasix/KCl   Gastrointestinal: Negative for abdominal pain and nausea.   Musculoskeletal: Negative for myalgias.   Neurological: Negative for dizziness, loss of consciousness and headaches.   Endo/Heme/Allergies: Does not bruise/bleed easily.   Psychiatric/Behavioral: Positive for memory loss.        Objective:   /70   Pulse 70   Ht 1.727 m (5' 8\")   Wt 61.7 kg (136 lb)   SpO2 90%   BMI 20.68 kg/m²      Physical Exam   Constitutional: He is oriented to person, place, and " time. He appears well-developed and well-nourished.   Tall, thin (BMI 20.83)   HENT:   Head: Normocephalic.   Eyes: EOM are normal.   Neck: Normal range of motion. Neck supple. No JVD present.   Cardiovascular: Normal rate, regular rhythm and normal heart sounds.    Pulmonary/Chest: Effort normal and breath sounds normal. No respiratory distress. He has no wheezes. He has no rales.   PM in left chest wall.   Abdominal: Soft. Bowel sounds are normal. He exhibits no distension. There is no tenderness.   Musculoskeletal: Normal range of motion. He exhibits edema.   1+ edema in his feet, to the ankles bilaterally, improved from last visit.   Neurological: He is alert and oriented to person, place, and time.   Skin: Skin is warm and dry. No rash noted.   Psychiatric: He has a normal mood and affect.     LABS AS OF 7/10/2018 - REVIEWED WITH PATIENT AND CAREGIVER:  Potassium 4.1  Glucose 95  BUN 21  Creatinine 1.2  GFR >60    Assessment:     1. Localized edema     2. Cardiac pacemaker in situ     3. Bradycardia     4. Dementia without behavioral disturbance, unspecified dementia type  REFERRAL TO NEUROLOGY       Medical Decision Making:  Today's Assessment / Status / Plan:     1. Edema, improved with PRN Lasix and KCl. Can continue to use as needed. BMP was normal.    2. Bradycardia with PM, with next PM interrogation in December 2018.    3. Dementia, progressive. Offered referral to neurology, which they would like to pursue. Can try OTC Prevagen in the meantime.    Same medications for now. Keep FU with Dr. NOMAN López in September 2018, and me for PM check in December 2018. FU sooner if clinical condition changes.    Collaborating MD: NAKIA López      No Recipients

## 2018-07-25 NOTE — PROGRESS NOTES
Chief Complaint   Patient presents with   • Follow-Up   • Edema   • Evaluation Of Medication Change       Subjective:   Ryan Wang is a 89 y.o. male who presents today for four week follow-up of edema.    Ryan is a 89 year old male with history of bradycardia with PM since 2015, mild hypertension and hyperlipidemia, normally followed by Dr. ONMAN López.    At last follow-up, he presented with his caregiver, as he was having increasing LE edema. I added some low dose Lasix/KCl for several days, and then PRN.    He is here today for follow-up. Edema is improved, but not completely gone.  Generally, he is doing well.  His caregiver does state that his memory appears to be getting worse, and was wondering if they can have this evaluated. She would also like to try OTC Prevagen. Otherwise, no other complaints: no chest pain, pressure or discomfort; no palpitations; no shortness of breath, no dizziness or syncope. BP has been stable. He is quite sedentary.    Past Medical History:   Diagnosis Date   • Arrhythmia     Bradycardia    • BPH (benign prostatic hypertrophy)    • Dizziness    • Edema    • Hyperlipidemia    • Pneumonia 04/2018    Hospitalized at [facility].   • Renovascular hypertension    • Sinus bradycardia September 2015    Status post PPM implantation.     Past Surgical History:   Procedure Laterality Date   • RECOVERY  9/29/2015    Procedure: CATH LAB PM INSERT [facility];  Surgeon: Waylon Surgery;  Location: SURGERY PRE-[place] PROC [place] [facility] [place];  Service:    • PACEMAKER INSERTION  September 2015    [employer] #[id number] implanted by Dr. Asencio.   • TONSILLECTOMY       Family History   Problem Relation Age of Onset   • Heart Disease Mother    • Heart Disease Father    • Heart Attack Neg Hx    • Heart Failure Neg Hx      Social History     Social History   • Marital status: Single     Spouse name: N/A   • Number of children: N/A   • Years of education: N/A     Occupational History  "  • Not on file.     Social History Main Topics   • Smoking status: Former Smoker     Packs/day: 0.25     Years: 10.00     Types: Cigarettes     Quit date: 1/1/1957   • Smokeless tobacco: Never Used   • Alcohol use No   • Drug use: No   • Sexual activity: Not Currently     Partners: Female     Other Topics Concern   • Not on file     Social History Narrative   • No narrative on file     No Known Allergies  Outpatient Encounter Prescriptions as of 7/25/2018   Medication Sig Dispense Refill   • Docusate Sodium (COLACE PO) Take  by mouth.     • finasteride (PROSCAR) 5 MG Tab Take 5 mg by mouth every day.     • tamsulosin (FLOMAX) 0.4 MG capsule Take 1 Cap by mouth every day. At night. 90 Cap 3   • furosemide (LASIX) 20 MG Tab Take 1 Tab by mouth every day. 30 Tab 1   • potassium chloride (MICRO-K) 10 MEQ capsule Take 1 Cap by mouth 2 times a day. 30 Cap 1     No facility-administered encounter medications on file as of 7/25/2018.      Review of Systems   Constitutional: Negative for chills and fever.   Respiratory: Negative for cough and shortness of breath.    Cardiovascular: Positive for leg swelling. Negative for chest pain, palpitations, orthopnea and PND.        Improved on Lasix/KCl   Gastrointestinal: Negative for abdominal pain and nausea.   Musculoskeletal: Negative for myalgias.   Neurological: Negative for dizziness, loss of consciousness and headaches.   Endo/Heme/Allergies: Does not bruise/bleed easily.   Psychiatric/Behavioral: Positive for memory loss.        Objective:   /70   Pulse 70   Ht 1.727 m (5' 8\")   Wt 61.7 kg (136 lb)   SpO2 90%   BMI 20.68 kg/m²     Physical Exam   Constitutional: He is oriented to person, place, and time. He appears well-developed and well-nourished.   Tall, thin (BMI 20.83)   HENT:   Head: Normocephalic.   Eyes: EOM are normal.   Neck: Normal range of motion. Neck supple. No JVD present.   Cardiovascular: Normal rate, regular rhythm and normal heart sounds.  "   Pulmonary/Chest: Effort normal and breath sounds normal. No respiratory distress. He has no wheezes. He has no rales.   PM in left chest wall.   Abdominal: Soft. Bowel sounds are normal. He exhibits no distension. There is no tenderness.   Musculoskeletal: Normal range of motion. He exhibits edema.   1+ edema in his feet, to the ankles bilaterally, improved from last visit.   Neurological: He is alert and oriented to person, place, and time.   Skin: Skin is warm and dry. No rash noted.   Psychiatric: He has a normal mood and affect.     LABS AS OF 7/10/2018 - REVIEWED WITH PATIENT AND CAREGIVER:  Potassium 4.1  Glucose 95  BUN 21  Creatinine 1.2  GFR >60    Assessment:     1. Localized edema     2. Cardiac pacemaker in situ     3. Bradycardia     4. Dementia without behavioral disturbance, unspecified dementia type  REFERRAL TO NEUROLOGY       Medical Decision Making:  Today's Assessment / Status / Plan:     1. Edema, improved with PRN Lasix and KCl. Can continue to use as needed. BMP was normal.    2. Bradycardia with PM, with next PM interrogation in December 2018.    3. Dementia, progressive. Offered referral to neurology, which they would like to pursue. Can try OTC Prevagen in the meantime.    Same medications for now. Keep FU with Dr. NOMAN López in September 2018, and me for PM check in December 2018. FU sooner if clinical condition changes.    Collaborating MD: NAKIA López

## 2018-09-10 ENCOUNTER — TELEPHONE (OUTPATIENT)
Dept: CARDIOLOGY | Facility: MEDICAL CENTER | Age: 83
End: 2018-09-10

## 2018-09-10 DIAGNOSIS — B35.1 ONYCHOMYCOSIS: ICD-10-CM

## 2018-09-10 DIAGNOSIS — Z51.5 END OF LIFE CARE: ICD-10-CM

## 2018-09-10 PROBLEM — R60.0 LOCALIZED EDEMA: Status: RESOLVED | Noted: 2018-06-07 | Resolved: 2018-09-10

## 2018-09-10 NOTE — TELEPHONE ENCOUNTER
Selena López M.D.  Ashlie Singh, R.RAJI.             2 things --     1. Podiatry referral (onycomycosis)   2. Is there hospice in [place]?  He would need - he was with his caregiver today....     Tx!      Referral to Podiatry and Hospice initiated.

## 2018-11-24 NOTE — PROGRESS NOTES
Patient was hospitalized in April 2018 for pneumonia, treated with antibiotics.  He does also have some mild LE edema.    Device is working normally. 18 mode switching episodes, all very short (<1% of total time).  Normal sensing and capture of RA and RV leads; stable impedances. Battery longevity is 7.2-9.9 years.  No changes are made today.    He is advised to make sure he gets up and walks periodically, and elevated legs when sitting. Same medications for now.    FU in 6 months for next PM check with me. He sees Dr. NOMAN López PRN.    Collaborating MD: Nithin   99

## 2019-01-10 ENCOUNTER — NON-PROVIDER VISIT (OUTPATIENT)
Dept: CARDIOLOGY | Facility: PHYSICIAN GROUP | Age: 84
End: 2019-01-10
Payer: MEDICARE

## 2019-01-10 VITALS
DIASTOLIC BLOOD PRESSURE: 78 MMHG | HEART RATE: 82 BPM | SYSTOLIC BLOOD PRESSURE: 122 MMHG | WEIGHT: 140 LBS | HEIGHT: 68 IN | BODY MASS INDEX: 21.22 KG/M2 | OXYGEN SATURATION: 96 %

## 2019-01-10 DIAGNOSIS — Z95.0 CARDIAC PACEMAKER IN SITU: Chronic | ICD-10-CM

## 2019-01-10 DIAGNOSIS — R00.1 BRADYCARDIA: ICD-10-CM

## 2019-01-10 DIAGNOSIS — R60.0 LOCALIZED EDEMA: ICD-10-CM

## 2019-01-10 PROCEDURE — 93280 PM DEVICE PROGR EVAL DUAL: CPT | Performed by: NURSE PRACTITIONER

## 2019-01-10 RX ORDER — POTASSIUM CHLORIDE 750 MG/1
10 CAPSULE, EXTENDED RELEASE ORAL
Qty: 30 CAP | Refills: 3 | Status: SHIPPED | OUTPATIENT
Start: 2019-01-11

## 2019-01-10 RX ORDER — BENZONATATE 200 MG/1
CAPSULE ORAL
COMMUNITY
Start: 2018-10-17 | End: 2019-01-10

## 2019-01-10 RX ORDER — DIVALPROEX SODIUM 125 MG/1
TABLET, DELAYED RELEASE ORAL
COMMUNITY
Start: 2018-12-30

## 2019-01-10 RX ORDER — PREDNISONE 20 MG/1
TABLET ORAL
COMMUNITY
Start: 2018-10-17 | End: 2019-01-10

## 2019-01-10 RX ORDER — FUROSEMIDE 20 MG/1
20 TABLET ORAL
Qty: 30 TAB | Refills: 3 | Status: SHIPPED | OUTPATIENT
Start: 2019-01-11

## 2019-01-10 NOTE — PROGRESS NOTES
Device is working normally. 11 mode switching episodes, all <10 seconds (<1% of total time).  Normal sensing and capture of RA and RV leads; stable impedances. Battery longevity is 7.0-9.1 years.  No changes are made today.    He can continue to use Lasix 20mg and KCl 10mEq M/W/F for LE edema.    FU in 6 months for next PM check with me, as well as FU with Dr. NOMAN López.    Collaborating MD: NOMAN López

## 2019-01-17 ENCOUNTER — OFFICE VISIT (OUTPATIENT)
Dept: URGENT CARE | Facility: PHYSICIAN GROUP | Age: 84
End: 2019-01-17
Payer: MEDICARE

## 2019-01-17 VITALS
HEART RATE: 80 BPM | BODY MASS INDEX: 21.22 KG/M2 | DIASTOLIC BLOOD PRESSURE: 78 MMHG | HEIGHT: 68 IN | OXYGEN SATURATION: 94 % | TEMPERATURE: 97.9 F | RESPIRATION RATE: 16 BRPM | WEIGHT: 140 LBS | SYSTOLIC BLOOD PRESSURE: 122 MMHG

## 2019-01-17 DIAGNOSIS — H61.23 BILATERAL IMPACTED CERUMEN: ICD-10-CM

## 2019-01-17 DIAGNOSIS — J22 LOWER RESP. TRACT INFECTION: ICD-10-CM

## 2019-01-17 PROCEDURE — 99214 OFFICE O/P EST MOD 30 MIN: CPT | Performed by: PHYSICIAN ASSISTANT

## 2019-01-17 RX ORDER — AZITHROMYCIN 250 MG/1
TABLET, FILM COATED ORAL
Qty: 6 TAB | Refills: 0 | Status: SHIPPED | OUTPATIENT
Start: 2019-01-17 | End: 2019-09-19

## 2019-04-19 NOTE — PROGRESS NOTES
Subjective:   Ryan Wang is a 89 y.o. male who presents today in follow-up in regards to his pacemaker implanted for dizziness and bradycardia    He is in good spirits. He is now 89  Seeing him here in [place]  No setbacks    Past Medical History:   Diagnosis Date   • Sinus bradycardia September 2015    Status post PPM implantation.   • Arrhythmia     Bradycardia    • BPH (benign prostatic hypertrophy)    • Dizziness    • Hyperlipidemia    • Renovascular hypertension      Past Surgical History:   Procedure Laterality Date   • RECOVERY  9/29/2015    Procedure: CATH LAB PM INSERT [facility];  Surgeon: Recoveryonbritney Surgery;  [place] [facility] [place]  [place]:    • PACEMAKER INSERTION  September 2015    [employer] DR #[id number] implanted by Dr. Asencio.   • TONSILLECTOMY       Family History   Problem Relation Age of Onset   • Heart Disease Mother    • Heart Disease Father    • Heart Attack Neg Hx    • Heart Failure Neg Hx      History   Smoking Status   • Former Smoker   • Packs/day: 0.25   • Years: 10.00   • Types: Cigarettes   • Quit date: 1/1/1957   Smokeless Tobacco   • Never Used     No Known Allergies  Outpatient Encounter Prescriptions as of 9/29/2017   Medication Sig Dispense Refill   • albuterol 108 (90 BASE) MCG/ACT Aero Soln inhalation aerosol Inhale 2 Puffs by mouth 2 times a day as needed. 1 Inhaler 0   • meclizine (ANTIVERT) 25 MG Tab Take 1 Tab by mouth 3 times a day as needed for Dizziness or Vertigo. 90 Tab 3   • finasteride (PROSCAR) 5 MG Tab Take 5 mg by mouth every day.     • tamsulosin (FLOMAX) 0.4 MG capsule Take 1 Cap by mouth every day. At night. 90 Cap 3     No facility-administered encounter medications on file as of 9/29/2017.      Review of Systems   Constitutional: Negative for malaise/fatigue and weight loss.   HENT: Negative for hearing loss.    Eyes: Negative.    Respiratory: Negative for cough and shortness of breath.    Cardiovascular: Negative for chest  pain, palpitations and leg swelling.   Gastrointestinal: Negative for heartburn and nausea.   Musculoskeletal: Negative.    Neurological: Negative for dizziness, loss of consciousness and headaches.   Endo/Heme/Allergies: Does not bruise/bleed easily.   Psychiatric/Behavioral: Negative for depression. The patient is not nervous/anxious.    All other systems reviewed and are negative.       Objective:   /70   Pulse 60     Physical Exam   Constitutional: He is oriented to person, place, and time. He appears well-developed and well-nourished.   Healthy slender  gentleman, much younger than stated age, very well kempt   HENT:   Head: Normocephalic and atraumatic.   Mouth/Throat: Mucous membranes are normal.   Eyes: EOM are normal. No scleral icterus.   Neck: No JVD present. No thyroid mass and no thyromegaly present.   Cardiovascular: Normal rate, regular rhythm and intact distal pulses.    No murmur heard.  PM in left chest   Pulmonary/Chest: Effort normal and breath sounds normal. No respiratory distress.   Abdominal: Bowel sounds are normal. He exhibits no distension.   Musculoskeletal: Normal range of motion. He exhibits no edema or tenderness.   Neurological: He is alert and oriented to person, place, and time. He has normal strength. He displays no tremor. He exhibits normal muscle tone. Coordination normal.   Skin: Skin is warm and dry.   Psychiatric: He has a normal mood and affect. His behavior is normal.   Vitals reviewed.      Assessment:     1. Bradycardia     2. Cardiac pacemaker in situ     3. Familial hypercholesterolemia         Medical Decision Making:  Today's Assessment / Status / Plan:     End of life care,   I gave him congratulations. We talked about health and is longevity in his life in [place]. We talked about end-of-life care as usual. He certainly does not want life-sustaining measures that, he feels very thankful for his health.  DNR.    PM/AF    No more AF, device  functioning normally no changes made - last check reviewed.  Recheck as planned 11/30    I certainly am not interested at this point in starting him on anticoagulation. His echo 2015 was normal    RTC 6 months pacemaker check ONLY [place]  I will see him PRN     negative...

## 2019-09-19 ENCOUNTER — OFFICE VISIT (OUTPATIENT)
Dept: CARDIOLOGY | Facility: PHYSICIAN GROUP | Age: 84
End: 2019-09-19
Payer: MEDICARE

## 2019-09-19 ENCOUNTER — NON-PROVIDER VISIT (OUTPATIENT)
Dept: CARDIOLOGY | Facility: PHYSICIAN GROUP | Age: 84
End: 2019-09-19
Payer: MEDICARE

## 2019-09-19 VITALS
HEART RATE: 84 BPM | SYSTOLIC BLOOD PRESSURE: 134 MMHG | DIASTOLIC BLOOD PRESSURE: 62 MMHG | WEIGHT: 143 LBS | BODY MASS INDEX: 21.74 KG/M2 | OXYGEN SATURATION: 92 %

## 2019-09-19 VITALS
DIASTOLIC BLOOD PRESSURE: 62 MMHG | WEIGHT: 143 LBS | HEART RATE: 84 BPM | SYSTOLIC BLOOD PRESSURE: 134 MMHG | OXYGEN SATURATION: 92 % | BODY MASS INDEX: 21.74 KG/M2

## 2019-09-19 DIAGNOSIS — R00.1 BRADYCARDIA: ICD-10-CM

## 2019-09-19 DIAGNOSIS — E55.9 VITAMIN D DEFICIENCY DISEASE: ICD-10-CM

## 2019-09-19 DIAGNOSIS — Z95.0 CARDIAC PACEMAKER IN SITU: Chronic | ICD-10-CM

## 2019-09-19 PROBLEM — R60.0 LOCALIZED EDEMA: Status: RESOLVED | Noted: 2018-06-07 | Resolved: 2019-09-19

## 2019-09-19 PROCEDURE — 93280 PM DEVICE PROGR EVAL DUAL: CPT | Performed by: NURSE PRACTITIONER

## 2019-09-19 PROCEDURE — 99214 OFFICE O/P EST MOD 30 MIN: CPT | Performed by: INTERNAL MEDICINE

## 2019-09-19 NOTE — PROGRESS NOTES
Chief Complaint   Patient presents with   • Bradycardia       Subjective:   Ryan Wang is a 89 y.o. 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  Advancing dementia but states he feels well.  Excited for his birthday and being 89 years old    No falls outside of 1 3 months ago    Past Medical History:   Diagnosis Date   • Arrhythmia     Bradycardia    • BPH (benign prostatic hypertrophy)    • Dizziness    • Edema    • Hyperlipidemia    • Pneumonia 2018    Hospitalized at [facility].   • Renovascular hypertension    • Sinus bradycardia 2015    Status post PPM implantation.     Past Surgical History:   Procedure Laterality Date   • RECOVERY  2015    Procedure: CATH LAB PM INSERT [facility];  Surgeon: Waylon Surgery;  Location: SURGERY PRE-POST PROC UNIT [facility] [place];  Service:    • PACEMAKER INSERTION  2015    [employer] DR #[id number] implanted by Dr. Asencio.   • TONSILLECTOMY       Family History   Problem Relation Age of Onset   • Heart Disease Mother    • Heart Disease Father    • Heart Attack Neg Hx    • Heart Failure Neg Hx      Social History     Socioeconomic History   • Marital status: Single     Spouse name: Not on file   • Number of children: Not on file   • Years of education: Not on file   • Highest education level: Not on file   Occupational History   • Not on file   Social Needs   • Financial resource strain: Not on file   • Food insecurity:     Worry: Not on file     Inability: Not on file   • Transportation needs:     Medical: Not on file     Non-medical: Not on file   Tobacco Use   • Smoking status: Former Smoker     Packs/day: 0.25     Years: 10.00     Pack years: 2.50     Types: Cigarettes     Last attempt to quit: 1957     Years since quittin.7   • Smokeless tobacco: Never Used    Substance and Sexual Activity   • Alcohol use: No   • Drug use: No   • Sexual activity: Not Currently     Partners: Female   Lifestyle   • Physical activity:     Days per week: Not on file     Minutes per session: Not on file   • Stress: Not on file   Relationships   • Social connections:     Talks on phone: Not on file     Gets together: Not on file     Attends Christianity service: Not on file     Active member of club or organization: Not on file     Attends meetings of clubs or organizations: Not on file     Relationship status: Not on file   • Intimate partner violence:     Fear of current or ex partner: Not on file     Emotionally abused: Not on file     Physically abused: Not on file     Forced sexual activity: Not on file   Other Topics Concern   • Not on file   Social History Narrative   • Not on file     No Known Allergies  Outpatient Encounter Medications as of 9/19/2019   Medication Sig Dispense Refill   • PROAIR  (90 Base) MCG/ACT Aero Soln inhalation aerosol      • divalproex (DEPAKOTE) 125 MG EC tablet      • furosemide (LASIX) 20 MG Tab Take 1 Tab by mouth every Monday, Wednesday, and Friday. 30 Tab 3   • potassium chloride (MICRO-K) 10 MEQ capsule Take 1 Cap by mouth every Monday, Wednesday, and Friday. 30 Cap 3   • finasteride (PROSCAR) 5 MG Tab Take 5 mg by mouth every day.     • tamsulosin (FLOMAX) 0.4 MG capsule Take 1 Cap by mouth every day. At night. 90 Cap 3   • [DISCONTINUED] azithromycin (ZITHROMAX) 250 MG Tab Use as package directs (Patient not taking: Reported on 9/19/2019) 6 Tab 0     No facility-administered encounter medications on file as of 9/19/2019.      Review of Systems   Unable to perform ROS: Age        Objective:   /62 (BP Location: Left arm, Patient Position: Sitting, BP Cuff Size: Adult)   Pulse 84   Wt 64.9 kg (143 lb)   SpO2 92%   BMI 21.74 kg/m²     Physical Exam   Constitutional: He is oriented to person, place, and time. No distress.   HENT:   Nose: Nose  normal.   Mouth/Throat: No oropharyngeal exudate.   Eyes: Pupils are equal, round, and reactive to light. EOM are normal. No scleral icterus.   Neck: No JVD present. No thyromegaly present.   Cardiovascular: Normal rate and intact distal pulses.   No murmur heard.  Pulmonary/Chest: Effort normal and breath sounds normal. No respiratory distress. He exhibits no tenderness.   Abdominal: Bowel sounds are normal. He exhibits no distension.   Musculoskeletal: He exhibits no edema or tenderness.   Neurological: He is alert and oriented to person, place, and time. No cranial nerve deficit.   Skin: Skin is warm and dry. He is not diaphoretic.   Psychiatric: He has a normal mood and affect. His behavior is normal.       Assessment:     1. Vitamin D deficiency disease  VITAMIN D 25-HYDROXY   2. Cardiac pacemaker in situ     3. Bradycardia         Medical Decision Making:  Today's Assessment / Status / Plan:     Bradycardia  Reviewed pacemaker check, no changes made normal function battery life good  Talked about end-of-life, doing well has good support in his community    Hypertension  Good control on minimal medication, supportive care  Requested labs from PCP in [facility] a vitamin D check has been low in the past, I ordered this for them    RTC for pacer check 6 months

## 2019-10-06 ENCOUNTER — OFFICE VISIT (OUTPATIENT)
Dept: URGENT CARE | Facility: CLINIC | Age: 84
End: 2019-10-06
Payer: MEDICARE

## 2019-10-06 VITALS
OXYGEN SATURATION: 94 % | BODY MASS INDEX: 21.67 KG/M2 | HEIGHT: 68 IN | RESPIRATION RATE: 19 BRPM | HEART RATE: 81 BPM | WEIGHT: 143 LBS | TEMPERATURE: 97 F

## 2019-10-06 DIAGNOSIS — J06.9 UPPER RESPIRATORY TRACT INFECTION, UNSPECIFIED TYPE: Primary | ICD-10-CM

## 2019-10-06 PROCEDURE — 99214 OFFICE O/P EST MOD 30 MIN: CPT | Performed by: PHYSICIAN ASSISTANT

## 2019-10-06 RX ORDER — DOXYCYCLINE HYCLATE 100 MG
100 TABLET ORAL 2 TIMES DAILY
Qty: 14 TAB | Refills: 0 | Status: SHIPPED | OUTPATIENT
Start: 2019-10-06 | End: 2019-10-13

## 2019-10-06 RX ORDER — METHYLPREDNISOLONE 4 MG/1
4 TABLET ORAL DAILY
Qty: 1 KIT | Refills: 0 | Status: SHIPPED | OUTPATIENT
Start: 2019-10-06 | End: 2019-10-12

## 2019-10-06 ASSESSMENT — ENCOUNTER SYMPTOMS
DIARRHEA: 0
COUGH: 1
NAUSEA: 0
VOMITING: 0
SORE THROAT: 0
CONSTIPATION: 0
SHORTNESS OF BREATH: 0
ABDOMINAL PAIN: 0
PALPITATIONS: 0
SPUTUM PRODUCTION: 1
WHEEZING: 0
FEVER: 0
CHILLS: 0

## 2019-10-07 NOTE — PATIENT INSTRUCTIONS
"Upper Respiratory Infection, Adult  Most upper respiratory infections (URIs) are a viral infection of the air passages leading to the lungs. A URI affects the nose, throat, and upper air passages. The most common type of URI is nasopharyngitis and is typically referred to as \"the common cold.\"  URIs run their course and usually go away on their own. Most of the time, a URI does not require medical attention, but sometimes a bacterial infection in the upper airways can follow a viral infection. This is called a secondary infection. Sinus and middle ear infections are common types of secondary upper respiratory infections.  Bacterial pneumonia can also complicate a URI. A URI can worsen asthma and chronic obstructive pulmonary disease (COPD). Sometimes, these complications can require emergency medical care and may be life threatening.  What are the causes?  Almost all URIs are caused by viruses. A virus is a type of germ and can spread from one person to another.  What increases the risk?  You may be at risk for a URI if:  · You smoke.  · You have chronic heart or lung disease.  · You have a weakened defense (immune) system.  · You are very young or very old.  · You have nasal allergies or asthma.  · You work in crowded or poorly ventilated areas.  · You work in health care facilities or schools.  What are the signs or symptoms?  Symptoms typically develop 2-3 days after you come in contact with a cold virus. Most viral URIs last 7-10 days. However, viral URIs from the influenza virus (flu virus) can last 14-18 days and are typically more severe. Symptoms may include:  · Runny or stuffy (congested) nose.  · Sneezing.  · Cough.  · Sore throat.  · Headache.  · Fatigue.  · Fever.  · Loss of appetite.  · Pain in your forehead, behind your eyes, and over your cheekbones (sinus pain).  · Muscle aches.  How is this diagnosed?  Your health care provider may diagnose a URI by:  · Physical exam.  · Tests to check that your " symptoms are not due to another condition such as:  ¨ Strep throat.  ¨ Sinusitis.  ¨ Pneumonia.  ¨ Asthma.  How is this treated?  A URI goes away on its own with time. It cannot be cured with medicines, but medicines may be prescribed or recommended to relieve symptoms. Medicines may help:  · Reduce your fever.  · Reduce your cough.  · Relieve nasal congestion.  Follow these instructions at home:  · Take medicines only as directed by your health care provider.  · Gargle warm saltwater or take cough drops to comfort your throat as directed by your health care provider.  · Use a warm mist humidifier or inhale steam from a shower to increase air moisture. This may make it easier to breathe.  · Drink enough fluid to keep your urine clear or pale yellow.  · Eat soups and other clear broths and maintain good nutrition.  · Rest as needed.  · Return to work when your temperature has returned to normal or as your health care provider advises. You may need to stay home longer to avoid infecting others. You can also use a face mask and careful hand washing to prevent spread of the virus.  · Increase the usage of your inhaler if you have asthma.  · Do not use any tobacco products, including cigarettes, chewing tobacco, or electronic cigarettes. If you need help quitting, ask your health care provider.  How is this prevented?  The best way to protect yourself from getting a cold is to practice good hygiene.  · Avoid oral or hand contact with people with cold symptoms.  · Wash your hands often if contact occurs.  There is no clear evidence that vitamin C, vitamin E, echinacea, or exercise reduces the chance of developing a cold. However, it is always recommended to get plenty of rest, exercise, and practice good nutrition.  Contact a health care provider if:  · You are getting worse rather than better.  · Your symptoms are not controlled by medicine.  · You have chills.  · You have worsening shortness of breath.  · You have brown  or red mucus.  · You have yellow or brown nasal discharge.  · You have pain in your face, especially when you bend forward.  · You have a fever.  · You have swollen neck glands.  · You have pain while swallowing.  · You have white areas in the back of your throat.  Get help right away if:  · You have severe or persistent:  ¨ Headache.  ¨ Ear pain.  ¨ Sinus pain.  ¨ Chest pain.  · You have chronic lung disease and any of the following:  ¨ Wheezing.  ¨ Prolonged cough.  ¨ Coughing up blood.  ¨ A change in your usual mucus.  · You have a stiff neck.  · You have changes in your:  ¨ Vision.  ¨ Hearing.  ¨ Thinking.  ¨ Mood.  This information is not intended to replace advice given to you by your health care provider. Make sure you discuss any questions you have with your health care provider.  Document Released: 06/13/2002 Document Revised: 08/20/2017 Document Reviewed: 03/25/2015  [employer] Interactive Patient Education © 2017 [employer]

## 2019-10-07 NOTE — PROGRESS NOTES
Subjective:   Ryan Wang is a 89 y.o. male who presents for Cough        Cough   This is a new problem. The problem has been unchanged. The problem occurs constantly. The cough is non-productive. Associated symptoms include nasal congestion. Pertinent negatives include no chest pain, chills, ear congestion, ear pain, fever, sore throat, shortness of breath or wheezing. Treatments tried: albuterol nebulizer  His past medical history is significant for asthma, bronchitis and pneumonia.     Patient present with caretaker, Joanne.     Review of Systems   Constitutional: Negative for chills, fever and malaise/fatigue.   HENT: Positive for congestion. Negative for ear pain and sore throat.    Respiratory: Positive for cough and sputum production. Negative for shortness of breath and wheezing.    Cardiovascular: Negative for chest pain and palpitations.   Gastrointestinal: Negative for abdominal pain, constipation, diarrhea, nausea and vomiting.   All other systems reviewed and are negative.      PMH:  has a past medical history of Arrhythmia, BPH (benign prostatic hypertrophy), Dizziness, Edema, Hyperlipidemia, Pneumonia (04/2018), Renovascular hypertension, and Sinus bradycardia (September 2015).  MEDS:   Current Outpatient Medications:   •  doxycycline (VIBRAMYCIN) 100 MG Tab, Take 1 Tab by mouth 2 times a day for 7 days., Disp: 14 Tab, Rfl: 0  •  methylPREDNISolone (MEDROL DOSEPAK) 4 MG Tablet Therapy Pack, Take 1 Tab by mouth every day for 6 days. Take as tapered-dosage schedule, Disp: 1 Kit, Rfl: 0  •  PROAIR  (90 Base) MCG/ACT Aero Soln inhalation aerosol, , Disp: , Rfl:   •  divalproex (DEPAKOTE) 125 MG EC tablet, , Disp: , Rfl:   •  furosemide (LASIX) 20 MG Tab, Take 1 Tab by mouth every Monday, Wednesday, and Friday., Disp: 30 Tab, Rfl: 3  •  potassium chloride (MICRO-K) 10 MEQ capsule, Take 1 Cap by mouth every Monday, Wednesday, and Friday., Disp: 30 Cap, Rfl: 3  •  finasteride (PROSCAR) 5 MG Tab, Take 5  "mg by mouth every day., Disp: , Rfl:   •  tamsulosin (FLOMAX) 0.4 MG capsule, Take 1 Cap by mouth every day. At night., Disp: 90 Cap, Rfl: 3  ALLERGIES: No Known Allergies  SURGHX:   Past Surgical History:   Procedure Laterality Date   • RECOVERY  9/29/2015    Procedure: CATH LAB PM INSERT [facility];  Surgeon: [facility];  [place] [facility] [place]:    • PACEMAKER INSERTION  September 2015    [facility] #[id number] implanted by Dr. Asecnio.   • TONSILLECTOMY       SOCHX:  reports that he quit smoking about 62 years ago. His smoking use included cigarettes. He has a 2.50 pack-year smoking history. He has never used smokeless tobacco. He reports that he does not drink alcohol or use drugs.  Family History   Problem Relation Age of Onset   • Heart Disease Mother    • Heart Disease Father    • Heart Attack Neg Hx    • Heart Failure Neg Hx         Objective:   Pulse 81   Temp 36.1 °C (97 °F) (Temporal)   Resp 19   Ht 1.727 m (5' 8\")   Wt 64.9 kg (143 lb)   SpO2 94%   BMI 21.74 kg/m²     Physical Exam   Constitutional: He is oriented to person, place, and time. He appears well-developed and well-nourished. No distress.   HENT:   Head: Normocephalic and atraumatic.   Right Ear: Tympanic membrane, external ear and ear canal normal.   Left Ear: Tympanic membrane, external ear and ear canal normal.   Nose: Nose normal.   Mouth/Throat: Uvula is midline, oropharynx is clear and moist and mucous membranes are normal.   Eyes: Pupils are equal, round, and reactive to light. Conjunctivae are normal.   Neck: Normal range of motion. Neck supple. No tracheal deviation present.   Cardiovascular: Normal rate and regular rhythm.   Pulmonary/Chest: Effort normal and breath sounds normal. No stridor. No respiratory distress. He has no wheezes. He has no rales.   Lymphadenopathy:     He has no cervical adenopathy.   Neurological: He is alert and oriented to person, place, and time. "   Skin: Skin is warm and dry. Capillary refill takes less than 2 seconds.   Psychiatric: He has a normal mood and affect. His behavior is normal.   Vitals reviewed.        Assessment/Plan:     1. Upper respiratory tract infection, unspecified type  doxycycline (VIBRAMYCIN) 100 MG Tab    methylPREDNISolone (MEDROL DOSEPAK) 4 MG Tablet Therapy Pack     Supportive care reviewed.  URI handout provided.    Follow-up with primary care provider within 7-10 days, referral placed.  If symptoms worsen or persist patient can return to clinic for reevaluation and CXR.  Red flags and STRICT emergency room precautions discussed. All side effects of medication discussed including allergic response, GI upset, tendon injury, etc. Patient and caretaker confirmed understanding of information.    Please note that this dictation was created using voice recognition software. I have made every reasonable attempt to correct obvious errors, but I expect that there are errors of grammar and possibly content that I did not discover before finalizing the note.

## 2020-02-20 ENCOUNTER — OFFICE VISIT (OUTPATIENT)
Dept: URGENT CARE | Facility: PHYSICIAN GROUP | Age: 85
End: 2020-02-20
Payer: MEDICARE

## 2020-02-20 VITALS
HEART RATE: 80 BPM | SYSTOLIC BLOOD PRESSURE: 130 MMHG | HEIGHT: 68 IN | OXYGEN SATURATION: 97 % | RESPIRATION RATE: 16 BRPM | TEMPERATURE: 97.9 F | WEIGHT: 141 LBS | DIASTOLIC BLOOD PRESSURE: 60 MMHG | BODY MASS INDEX: 21.37 KG/M2

## 2020-02-20 DIAGNOSIS — R06.02 SHORTNESS OF BREATH: ICD-10-CM

## 2020-02-20 DIAGNOSIS — R05.9 COUGH: ICD-10-CM

## 2020-02-20 DIAGNOSIS — Z87.01 HISTORY OF PNEUMONIA: ICD-10-CM

## 2020-02-20 PROCEDURE — 99214 OFFICE O/P EST MOD 30 MIN: CPT | Performed by: NURSE PRACTITIONER

## 2020-02-20 ASSESSMENT — ENCOUNTER SYMPTOMS
VOMITING: 0
WHEEZING: 1
SPUTUM PRODUCTION: 1
SHORTNESS OF BREATH: 1
CHILLS: 1
SORE THROAT: 0
DIZZINESS: 0
NAUSEA: 0
FEVER: 0
COUGH: 1
HEADACHES: 0
MYALGIAS: 1
EYE PAIN: 0

## 2020-02-20 NOTE — PROGRESS NOTES
"Subjective:   Ryan Wang  is a 89 y.o. male who presents for Congestion        Cough   This is a new problem. The current episode started in the past 7 days. The problem has been gradually worsening. The problem occurs constantly. The cough is productive of sputum. Associated symptoms include chills, myalgias, nasal congestion, shortness of breath and wheezing. Pertinent negatives include no chest pain, fever, headaches, postnasal drip, rash or sore throat. Nothing aggravates the symptoms. He has tried nothing for the symptoms. The treatment provided no relief. His past medical history is significant for pneumonia.   Patient brought into clinic by caregiver for concerns of worsening cough suspected pneumonia.  Patient was recently treated for pneumonia 1 months ago in which she was sent to a nursing home.  Patient does wear oxygen intermittently since discharge from the hospital however per the caregiver in the last 2 days he has been having low oxygen saturations.  Patient's oxygen was at 81% earlier this morning prior to wearing nasal cannula.  Per the caregiver his oxygen is normally around 95% on room air.  Per the caregiver patient lives alone.  Review of Systems   Constitutional: Positive for chills. Negative for fever.   HENT: Positive for congestion. Negative for postnasal drip and sore throat.    Eyes: Negative for pain.   Respiratory: Positive for cough, sputum production, shortness of breath and wheezing.    Cardiovascular: Negative for chest pain.   Gastrointestinal: Negative for nausea and vomiting.   Genitourinary: Negative for hematuria.   Musculoskeletal: Positive for myalgias.   Skin: Negative for rash.   Neurological: Negative for dizziness and headaches.     No Known Allergies   Objective:   /60 (BP Location: Right arm, Patient Position: Sitting, BP Cuff Size: Adult)   Pulse 80   Temp 36.6 °C (97.9 °F) (Temporal)   Resp 16   Ht 1.727 m (5' 8\")   Wt 64 kg (141 lb)   SpO2 97% Comment: " on 2 L  BMI 21.44 kg/m²   Physical Exam  Vitals signs and nursing note reviewed.   Constitutional:       General: He is not in acute distress.     Appearance: He is well-developed. He is ill-appearing.   HENT:      Head: Normocephalic and atraumatic.      Right Ear: Tympanic membrane and external ear normal.      Left Ear: Tympanic membrane and external ear normal.      Nose: Nose normal.      Right Sinus: No maxillary sinus tenderness or frontal sinus tenderness.      Left Sinus: No maxillary sinus tenderness or frontal sinus tenderness.      Mouth/Throat:      Mouth: Mucous membranes are moist.      Pharynx: Uvula midline. No posterior oropharyngeal erythema.      Tonsils: No tonsillar exudate or tonsillar abscesses.   Eyes:      General:         Right eye: No discharge.         Left eye: No discharge.      Conjunctiva/sclera: Conjunctivae normal.      Pupils: Pupils are equal, round, and reactive to light.   Cardiovascular:      Rate and Rhythm: Normal rate and regular rhythm.      Heart sounds: No murmur.   Pulmonary:      Effort: Pulmonary effort is normal. No respiratory distress.      Breath sounds: Wheezing and rhonchi present.   Abdominal:      General: There is no distension.      Palpations: Abdomen is soft.      Tenderness: There is no abdominal tenderness.   Musculoskeletal: Normal range of motion.   Skin:     General: Skin is warm and dry.   Neurological:      General: No focal deficit present.      Mental Status: He is alert and oriented to person, place, and time. Mental status is at baseline.      Gait: Gait (gait at baseline) normal.   Psychiatric:         Judgment: Judgment normal.           Assessment/Plan:     1. Cough     2. Shortness of breath     3. History of pneumonia       Patient is a 89-year-old male present with the stated above.  Patient wearing 2 L nasal cannula, oxygen normal at this time however lung sounds with diffuse rhonchi and wheezing concerning of pneumonia.  Per the  patient's caregiver oxygen saturations have been low in the past 2 days in the low 80s.  [place] time concerned of progression of symptoms.. At this time, I feel the patient requires a higher level of care including closer monitoring, stat lab work and/or imaging for further evaluation for complaints of cough, short of breath for concerns of pneumonia.  This has been discussed with the patient and they state agreement and understanding.  I offered the patient an ambulance ride and  the patient is declining at this time. The patient is in no acute distress upon clinic departure and will go directly to ED without delay.

## 2024-03-14 NOTE — MR AVS SNAPSHOT
"        Ryan Wang   2017 11:35 AM   Office Visit   MRN: [id number]    Department:  [facility]   Dept Phone:  [phone]    Description:  Male : 9/10/1921   Provider:  Henry Hebert PA-C           Reason for Visit     Cough cough, chest congestion x2 months       Allergies as of 2017     No Known Allergies      You were diagnosed with     Chronic obstructive pulmonary disease, unspecified COPD type (CMS-[facility])   [[id number]]  -  Primary     Wheeze   [[id number]]         Vital Signs     Blood Pressure Pulse Temperature Respirations Height Weight    102/60 mmHg 87 36.7 °C (98.1 °F) 12 1.727 m (5' 8\") 65.772 kg (145 lb)    Body Mass Index Oxygen Saturation Smoking Status             22.05 kg/m2 94% Former Smoker         Basic Information     Date Of Birth Sex Race Ethnicity Preferred Language    9/10/1921 Male Black or  Non- English      Your appointments     2017  2:20 PM   PACER CHECK ONLY with Gabby Raya A.P.NPhi   [facility] (--)    [address] [place] [id number] [phone]              Problem List              ICD-10-CM Priority Class Noted - Resolved    Bradycardia R00.1 High  2015 - Present    Renovascular hypertension I15.0 Medium  2015 - Present    Cardiac pacemaker in situ (Chronic) Z95.0 High  Unknown - Present    Hyperlipidemia E78.5 Medium  10/22/2015 - Present    Anemia D64.9 Low  2015 - Present    Dizziness R42   2016 - Present    BPH (benign prostatic hypertrophy) N40.0   2016 - Present      Health Maintenance        Date Due Completion Dates    IMM DTaP/Tdap/Td Vaccine (1 - Tdap) 9/10/1940 ---    IMM ZOSTER VACCINE 9/10/1981 ---    IMM PNEUMOCOCCAL 65+ (ADULT) LOW/MEDIUM RISK SERIES (1 of 2 - PCV13) 9/10/1986 ---            Current Immunizations     No immunizations on file.      Below and/or attached are the medications your provider expects you to take. Review all " of your home medications and newly ordered medications with your provider and/or pharmacist. Follow medication instructions as directed by your provider and/or pharmacist. Please keep your medication list with you and share with your provider. Update the information when medications are discontinued, doses are changed, or new medications (including over-the-counter products) are added; and carry medication information at all times in the event of emergency situations     Allergies:  No Known Allergies          Medications  Valid as of: June 13, 2017 - 12:18 PM    Generic Name Brand Name Tablet Size Instructions for use    Albuterol Sulfate (Aero Soln) albuterol 108 (90 BASE) MCG/ACT Inhale 2 Puffs by mouth 2 times a day as needed.        Finasteride (Tab) PROSCAR 5 MG Take 5 mg by mouth every day.        Meclizine HCl (Tab) ANTIVERT 25 MG Take 1 Tab by mouth 3 times a day as needed for Dizziness or Vertigo.        Tamsulosin HCl (Cap) FLOMAX 0.4 MG Take 1 Cap by mouth every day. At night.        .                 Medicines prescribed today were sent to:     [facility] [address] [facility] [place] - [address] [facility] [address]     [address] [facility] [address]    Phone: [phone] Fax: [phone]    Open 24 Hours?: No      Medication refill instructions:       If your prescription bottle indicates you have medication refills left, it is not necessary to call your provider’s office. Please contact your pharmacy and they will refill your medication.    If your prescription bottle indicates you do not have any refills left, you may request refills at any time through one of the following ways: The online [employer] system (except Urgent Care), by calling your provider’s office, or by asking your pharmacy to contact your provider’s office with a refill request. Medication refills are processed only during regular business hours and may not be available until the next business day. Your provider may  request additional information or to have a follow-up visit with you prior to refilling your medication.   *Please Note: Medication refills are assigned a new Rx number when refilled electronically. Your pharmacy may indicate that no refills were authorized even though a new prescription for the same medication is available at the pharmacy. Please request the medicine by name with the pharmacy before contacting your provider for a refill.           [employer] Access Code: Activation code not generated  Current [employer] Status: Active           149.9